# Patient Record
Sex: FEMALE | Race: OTHER | Employment: UNEMPLOYED | URBAN - NONMETROPOLITAN AREA
[De-identification: names, ages, dates, MRNs, and addresses within clinical notes are randomized per-mention and may not be internally consistent; named-entity substitution may affect disease eponyms.]

---

## 2019-05-29 ENCOUNTER — OFFICE VISIT (OUTPATIENT)
Dept: FAMILY MEDICINE CLINIC | Age: 29
End: 2019-05-29
Payer: OTHER GOVERNMENT

## 2019-05-29 VITALS
BODY MASS INDEX: 23.74 KG/M2 | HEART RATE: 73 BPM | RESPIRATION RATE: 14 BRPM | OXYGEN SATURATION: 98 % | DIASTOLIC BLOOD PRESSURE: 60 MMHG | WEIGHT: 134 LBS | SYSTOLIC BLOOD PRESSURE: 100 MMHG | HEIGHT: 63 IN

## 2019-05-29 DIAGNOSIS — K59.01 SLOW TRANSIT CONSTIPATION: ICD-10-CM

## 2019-05-29 DIAGNOSIS — R53.83 OTHER FATIGUE: ICD-10-CM

## 2019-05-29 DIAGNOSIS — Z13.31 POSITIVE DEPRESSION SCREENING: Primary | ICD-10-CM

## 2019-05-29 DIAGNOSIS — N92.6 IRREGULAR PERIODS: ICD-10-CM

## 2019-05-29 LAB
CONTROL: NORMAL
PREGNANCY TEST URINE, POC: NEGATIVE

## 2019-05-29 PROCEDURE — 99203 OFFICE O/P NEW LOW 30 MIN: CPT | Performed by: PHYSICIAN ASSISTANT

## 2019-05-29 PROCEDURE — G8431 POS CLIN DEPRES SCRN F/U DOC: HCPCS | Performed by: PHYSICIAN ASSISTANT

## 2019-05-29 PROCEDURE — G0444 DEPRESSION SCREEN ANNUAL: HCPCS | Performed by: PHYSICIAN ASSISTANT

## 2019-05-29 PROCEDURE — 81025 URINE PREGNANCY TEST: CPT | Performed by: PHYSICIAN ASSISTANT

## 2019-05-29 RX ORDER — ACETAMINOPHEN AND CODEINE PHOSPHATE 120; 12 MG/5ML; MG/5ML
1 SOLUTION ORAL DAILY
Qty: 30 TABLET | Refills: 5 | Status: SHIPPED | OUTPATIENT
Start: 2019-05-29 | End: 2019-05-30 | Stop reason: SDUPTHER

## 2019-05-29 RX ORDER — DOCUSATE SODIUM 100 MG/1
100 CAPSULE, LIQUID FILLED ORAL 2 TIMES DAILY PRN
Qty: 60 CAPSULE | Refills: 5 | Status: SHIPPED | OUTPATIENT
Start: 2019-05-29 | End: 2020-04-28 | Stop reason: ALTCHOICE

## 2019-05-29 RX ORDER — DOCUSATE SODIUM 100 MG/1
100 CAPSULE, LIQUID FILLED ORAL 2 TIMES DAILY PRN
COMMUNITY
End: 2019-05-29 | Stop reason: SDUPTHER

## 2019-05-29 RX ORDER — CETIRIZINE HYDROCHLORIDE 10 MG/1
10 TABLET ORAL DAILY
Qty: 30 TABLET | Refills: 5 | Status: SHIPPED | OUTPATIENT
Start: 2019-05-29 | End: 2020-01-28 | Stop reason: ALTCHOICE

## 2019-05-29 SDOH — HEALTH STABILITY: MENTAL HEALTH: HOW OFTEN DO YOU HAVE A DRINK CONTAINING ALCOHOL?: NEVER

## 2019-05-29 ASSESSMENT — PATIENT HEALTH QUESTIONNAIRE - PHQ9
1. LITTLE INTEREST OR PLEASURE IN DOING THINGS: 0
7. TROUBLE CONCENTRATING ON THINGS, SUCH AS READING THE NEWSPAPER OR WATCHING TELEVISION: 0
9. THOUGHTS THAT YOU WOULD BE BETTER OFF DEAD, OR OF HURTING YOURSELF: 0
5. POOR APPETITE OR OVEREATING: 1
8. MOVING OR SPEAKING SO SLOWLY THAT OTHER PEOPLE COULD HAVE NOTICED. OR THE OPPOSITE, BEING SO FIGETY OR RESTLESS THAT YOU HAVE BEEN MOVING AROUND A LOT MORE THAN USUAL: 0
10. IF YOU CHECKED OFF ANY PROBLEMS, HOW DIFFICULT HAVE THESE PROBLEMS MADE IT FOR YOU TO DO YOUR WORK, TAKE CARE OF THINGS AT HOME, OR GET ALONG WITH OTHER PEOPLE: 0
SUM OF ALL RESPONSES TO PHQ QUESTIONS 1-9: 11
SUM OF ALL RESPONSES TO PHQ QUESTIONS 1-9: 11
6. FEELING BAD ABOUT YOURSELF - OR THAT YOU ARE A FAILURE OR HAVE LET YOURSELF OR YOUR FAMILY DOWN: 2
3. TROUBLE FALLING OR STAYING ASLEEP: 2
4. FEELING TIRED OR HAVING LITTLE ENERGY: 3
SUM OF ALL RESPONSES TO PHQ9 QUESTIONS 1 & 2: 3
2. FEELING DOWN, DEPRESSED OR HOPELESS: 3

## 2019-05-29 ASSESSMENT — ENCOUNTER SYMPTOMS
VOMITING: 0
SHORTNESS OF BREATH: 0
TROUBLE SWALLOWING: 0
DIARRHEA: 0
ABDOMINAL PAIN: 0
COUGH: 0
NAUSEA: 0

## 2019-05-29 NOTE — PROGRESS NOTES
Lissette Blackmanoso  1990  25 y.o.  female    SUBJECTIVE:    Chief Complaint   Patient presents with   [address] pt, here to establish care. Former PCP was clinic in [place]. Sees GYN at [facility].  Contraception     Pt gave birth to daughter 12/06/18. Wants to discuss [facility] options.  Depression     Positive Depression screening. HPI   Depression-pt c/o depression over last several months. Pt is from [place] and worked in [employer] during [place] [place] [employer]. Met  through employer, has [de-identified] old daughter. Pt left  approx 3 months ago and returned to [place]. Pt states  was not physically abusive and never felt unsafe. Pt states he did not help her with , is inattentive among other issues. Recently returned to [employer] 2 weeks ago and she states in last week, things have \"gone back to how they were before I left\". Pt does not want to medication at this time as she is breast feeding and denies SI, Is trying to get  to do couples therapy. PHQ-9 Total Score: 11 (5/29/2019 10:45 AM)    Contraception issues-pt has had one menses since delivery of child six months ago. Was told by gyn at hospital she can do minipill while breast feeding. Pt would like to start now if able. Fatigue-seems worse than she feels is appropriate with six month old. Pt is taking prenatal vitamins and still feels very fatigued, tired all the time, worn out    Constipation-x several months, using colace with good results    Current Outpatient Medications on File Prior to Visit   Medication Sig Dispense Refill    Prenatal Vit-Fe Fumarate-FA (PRENATAL VITAMIN PO) Take 1 tablet by mouth daily       No current facility-administered medications on file prior to visit. Review of PMH, PSH, Family Hx, Allergies and updates made as needed.       No Known Allergies    Past Medical History:   Diagnosis Date    Depression     PCOD (polycystic ovarian disease)        Past Surgical History:   Procedure Laterality Date    CHOLECYSTECTOMY  2014    DENTAL SURGERY Bilateral     4 wisdom teeth removed       Social History     Socioeconomic History    Marital status:      Spouse name: None    Number of children: None    Years of education: None    Highest education level: None   Occupational History    None   Social Needs    Financial resource strain: None    Food insecurity:     Worry: None     Inability: None    Transportation needs:     Medical: None     Non-medical: None   Tobacco Use    Smoking status: Never Smoker    Smokeless tobacco: Never Used   Substance and Sexual Activity    Alcohol use: Never     Frequency: Never    Drug use: Never    Sexual activity: Yes     Partners: Male   Lifestyle    Physical activity:     Days per week: None     Minutes per session: None    Stress: None   Relationships    Social connections:     Talks on phone: None     Gets together: None     Attends Judaism service: None     Active member of club or organization: None     Attends meetings of clubs or organizations: None     Relationship status: None    Intimate partner violence:     Fear of current or ex partner: None     Emotionally abused: None     Physically abused: None     Forced sexual activity: None   Other Topics Concern    None   Social History Narrative    None       Review of Systems   Constitutional: Positive for fatigue. Negative for activity change, appetite change, chills, fever and unexpected weight change. HENT: Negative for dental problem, hearing loss and trouble swallowing. Eyes: Negative for visual disturbance. Respiratory: Negative for cough and shortness of breath. Cardiovascular: Negative for chest pain. Gastrointestinal: Negative for abdominal pain, diarrhea, nausea and vomiting. Endocrine: Negative for cold intolerance, heat intolerance, polydipsia, polyphagia and polyuria. Genitourinary: Positive for menstrual problem.    Musculoskeletal: Negative for negative         Other fatigue     Labs as ordered today         Relevant Orders    POCT urine pregnancy (Completed)    TSH WITH REFLEX TO FT4    Comprehensive Metabolic Panel    CBC Auto Differential    Positive depression screening - Primary     Discussed treatment options with pt, she declines medication at this time, is trying to get  to go to counseling. If this fails, she may pursue counseling with Dr Vic Terry for herself. Relevant Orders    Positive Screen for Clinical Depression with a Documented Follow-up Plan  (Completed)               Return if symptoms worsen or fail to improve. On the basis of positive PHQ-9 screening (PHQ-9 Total Score: 11), the following plan was implemented: patient declines further evaluation/treatment for depression. Patient will follow-up in 3 month(s) with PCP.

## 2019-05-30 LAB
A/G RATIO: 1.7 (ref 1.1–2.2)
ALBUMIN SERPL-MCNC: 4.8 G/DL (ref 3.4–5)
ALP BLD-CCNC: 128 U/L (ref 40–129)
ALT SERPL-CCNC: 11 U/L (ref 10–40)
ANION GAP SERPL CALCULATED.3IONS-SCNC: 15 MMOL/L (ref 3–16)
AST SERPL-CCNC: 15 U/L (ref 15–37)
BASOPHILS ABSOLUTE: 0 K/UL (ref 0–0.2)
BASOPHILS RELATIVE PERCENT: 0.6 %
BILIRUB SERPL-MCNC: 0.6 MG/DL (ref 0–1)
BUN BLDV-MCNC: 14 MG/DL (ref 7–20)
CALCIUM SERPL-MCNC: 9.6 MG/DL (ref 8.3–10.6)
CHLORIDE BLD-SCNC: 102 MMOL/L (ref 99–110)
CO2: 25 MMOL/L (ref 21–32)
CREAT SERPL-MCNC: 0.7 MG/DL (ref 0.6–1.1)
EOSINOPHILS ABSOLUTE: 0.1 K/UL (ref 0–0.6)
EOSINOPHILS RELATIVE PERCENT: 1.9 %
GFR AFRICAN AMERICAN: >60
GFR NON-AFRICAN AMERICAN: >60
GLOBULIN: 2.9 G/DL
GLUCOSE BLD-MCNC: 74 MG/DL (ref 70–99)
HCT VFR BLD CALC: 46.8 % (ref 36–48)
HEMOGLOBIN: 15.9 G/DL (ref 12–16)
LYMPHOCYTES ABSOLUTE: 2.6 K/UL (ref 1–5.1)
LYMPHOCYTES RELATIVE PERCENT: 35.8 %
MCH RBC QN AUTO: 31.8 PG (ref 26–34)
MCHC RBC AUTO-ENTMCNC: 33.9 G/DL (ref 31–36)
MCV RBC AUTO: 93.7 FL (ref 80–100)
MONOCYTES ABSOLUTE: 0.6 K/UL (ref 0–1.3)
MONOCYTES RELATIVE PERCENT: 8 %
NEUTROPHILS ABSOLUTE: 3.9 K/UL (ref 1.7–7.7)
NEUTROPHILS RELATIVE PERCENT: 53.7 %
PDW BLD-RTO: 14.8 % (ref 12.4–15.4)
PLATELET # BLD: 202 K/UL (ref 135–450)
PMV BLD AUTO: 8.6 FL (ref 5–10.5)
POTASSIUM SERPL-SCNC: 4.2 MMOL/L (ref 3.5–5.1)
RBC # BLD: 4.99 M/UL (ref 4–5.2)
SODIUM BLD-SCNC: 142 MMOL/L (ref 136–145)
TOTAL PROTEIN: 7.7 G/DL (ref 6.4–8.2)
TSH REFLEX FT4: 1.9 UIU/ML (ref 0.27–4.2)
WBC # BLD: 7.3 K/UL (ref 4–11)

## 2019-05-30 RX ORDER — ACETAMINOPHEN AND CODEINE PHOSPHATE 120; 12 MG/5ML; MG/5ML
1 SOLUTION ORAL DAILY
Qty: 30 TABLET | Refills: 5 | Status: CANCELLED | OUTPATIENT
Start: 2019-05-30

## 2019-05-30 RX ORDER — ACETAMINOPHEN AND CODEINE PHOSPHATE 120; 12 MG/5ML; MG/5ML
1 SOLUTION ORAL DAILY
Qty: 30 TABLET | Refills: 5 | Status: SHIPPED | OUTPATIENT
Start: 2019-05-30 | End: 2020-01-28 | Stop reason: ALTCHOICE

## 2019-05-30 NOTE — PROGRESS NOTES
There was an e-prescribing error on [employer]:     E-Prescribed Message Needing Your Attention   Received: Yesterday   Message Contents   López, Surescripts Out  P [facility] Staff             An error occurred while processing the e-prescribing message. The message was not sent electronically to the requested pharmacy. Contact the pharmacy about the new prescription.

## 2019-05-30 NOTE — ASSESSMENT & PLAN NOTE
Discussed treatment options with pt, she declines medication at this time, is trying to get  to go to counseling. If this fails, she may pursue counseling with Dr Taylor Sweeney for herself.

## 2019-08-07 ENCOUNTER — TELEPHONE (OUTPATIENT)
Dept: FAMILY MEDICINE CLINIC | Age: 29
End: 2019-08-07

## 2019-08-07 DIAGNOSIS — F32.A DEPRESSION, UNSPECIFIED DEPRESSION TYPE: Primary | ICD-10-CM

## 2019-08-07 NOTE — TELEPHONE ENCOUNTER
Let pt know I will put in referral for Dr Vandana Nielson and then can we make the patient an appointment with him. If she prefers to see someone else, she just needs to check with her insurance for covered providers then make appt.  She should not need referral to outside providers

## 2019-08-08 ENCOUNTER — TELEPHONE (OUTPATIENT)
Dept: FAMILY MEDICINE CLINIC | Age: 29
End: 2019-08-08

## 2019-08-08 NOTE — TELEPHONE ENCOUNTER
Tried pt's primary # but it was invalid. Called and left a vm with her  asking for a call back. We need to schedule her referral appt with Dr. Brannon Carlton.

## 2019-08-08 NOTE — TELEPHONE ENCOUNTER
Client reports that call has been made to  [facility]. Client already has an appointment made with Dr Arnulfo Okeefe.

## 2019-08-27 ENCOUNTER — OFFICE VISIT (OUTPATIENT)
Dept: PSYCHOLOGY | Age: 29
End: 2019-08-27
Payer: OTHER GOVERNMENT

## 2019-08-27 DIAGNOSIS — F32.A DEPRESSION, UNSPECIFIED DEPRESSION TYPE: Primary | ICD-10-CM

## 2019-08-27 PROCEDURE — 90791 PSYCH DIAGNOSTIC EVALUATION: CPT | Performed by: PSYCHOLOGIST

## 2019-08-27 ASSESSMENT — PATIENT HEALTH QUESTIONNAIRE - PHQ9
6. FEELING BAD ABOUT YOURSELF - OR THAT YOU ARE A FAILURE OR HAVE LET YOURSELF OR YOUR FAMILY DOWN: 2
SUM OF ALL RESPONSES TO PHQ QUESTIONS 1-9: 14
3. TROUBLE FALLING OR STAYING ASLEEP: 2
SUM OF ALL RESPONSES TO PHQ QUESTIONS 1-9: 14
9. THOUGHTS THAT YOU WOULD BE BETTER OFF DEAD, OR OF HURTING YOURSELF: 0
10. IF YOU CHECKED OFF ANY PROBLEMS, HOW DIFFICULT HAVE THESE PROBLEMS MADE IT FOR YOU TO DO YOUR WORK, TAKE CARE OF THINGS AT HOME, OR GET ALONG WITH OTHER PEOPLE: 1
1. LITTLE INTEREST OR PLEASURE IN DOING THINGS: 2
5. POOR APPETITE OR OVEREATING: 1
2. FEELING DOWN, DEPRESSED OR HOPELESS: 3
7. TROUBLE CONCENTRATING ON THINGS, SUCH AS READING THE NEWSPAPER OR WATCHING TELEVISION: 2
4. FEELING TIRED OR HAVING LITTLE ENERGY: 2
8. MOVING OR SPEAKING SO SLOWLY THAT OTHER PEOPLE COULD HAVE NOTICED. OR THE OPPOSITE, BEING SO FIGETY OR RESTLESS THAT YOU HAVE BEEN MOVING AROUND A LOT MORE THAN USUAL: 0
SUM OF ALL RESPONSES TO PHQ9 QUESTIONS 1 & 2: 5

## 2020-01-28 ENCOUNTER — OFFICE VISIT (OUTPATIENT)
Dept: FAMILY MEDICINE CLINIC | Age: 30
End: 2020-01-28
Payer: OTHER GOVERNMENT

## 2020-01-28 VITALS
RESPIRATION RATE: 16 BRPM | DIASTOLIC BLOOD PRESSURE: 72 MMHG | SYSTOLIC BLOOD PRESSURE: 120 MMHG | HEART RATE: 69 BPM | WEIGHT: 134 LBS | TEMPERATURE: 98.7 F | BODY MASS INDEX: 23.93 KG/M2 | OXYGEN SATURATION: 98 %

## 2020-01-28 PROBLEM — E78.2 MIXED HYPERLIPIDEMIA: Status: ACTIVE | Noted: 2020-01-28

## 2020-01-28 LAB
BILIRUBIN, POC: NEGATIVE
BLOOD URINE, POC: ABNORMAL
CLARITY, POC: ABNORMAL
COLOR, POC: YELLOW
CONTROL: NORMAL
GLUCOSE URINE, POC: NEGATIVE
KETONES, POC: NEGATIVE
LEUKOCYTE EST, POC: NEGATIVE
NITRITE, POC: NEGATIVE
PH, POC: 6.5
PREGNANCY TEST URINE, POC: NEGATIVE
PROTEIN, POC: NEGATIVE
SPECIFIC GRAVITY, POC: 1.02
UROBILINOGEN, POC: ABNORMAL

## 2020-01-28 PROCEDURE — 90471 IMMUNIZATION ADMIN: CPT | Performed by: PHYSICIAN ASSISTANT

## 2020-01-28 PROCEDURE — 81025 URINE PREGNANCY TEST: CPT | Performed by: PHYSICIAN ASSISTANT

## 2020-01-28 PROCEDURE — 99214 OFFICE O/P EST MOD 30 MIN: CPT | Performed by: PHYSICIAN ASSISTANT

## 2020-01-28 PROCEDURE — 90686 IIV4 VACC NO PRSV 0.5 ML IM: CPT | Performed by: PHYSICIAN ASSISTANT

## 2020-01-28 PROCEDURE — 81002 URINALYSIS NONAUTO W/O SCOPE: CPT | Performed by: PHYSICIAN ASSISTANT

## 2020-01-28 RX ORDER — DROSPIRENONE AND ETHINYL ESTRADIOL 0.02-3(28)
1 KIT ORAL DAILY
Qty: 28 TABLET | Refills: 5 | Status: SHIPPED | OUTPATIENT
Start: 2020-01-28 | End: 2020-03-12

## 2020-01-28 RX ORDER — DROSPIRENONE AND ETHINYL ESTRADIOL 0.02-3(28)
1 KIT ORAL DAILY
COMMUNITY
End: 2020-01-28 | Stop reason: SDUPTHER

## 2020-01-28 RX ORDER — ATORVASTATIN CALCIUM 10 MG/1
10 TABLET, FILM COATED ORAL DAILY
COMMUNITY
End: 2020-01-28 | Stop reason: SDUPTHER

## 2020-01-28 RX ORDER — ATORVASTATIN CALCIUM 10 MG/1
10 TABLET, FILM COATED ORAL DAILY
Qty: 30 TABLET | Refills: 5 | Status: SHIPPED | OUTPATIENT
Start: 2020-01-28 | End: 2020-04-28 | Stop reason: ALTCHOICE

## 2020-01-28 ASSESSMENT — PATIENT HEALTH QUESTIONNAIRE - PHQ9
SUM OF ALL RESPONSES TO PHQ QUESTIONS 1-9: 0
1. LITTLE INTEREST OR PLEASURE IN DOING THINGS: 0
2. FEELING DOWN, DEPRESSED OR HOPELESS: 0
SUM OF ALL RESPONSES TO PHQ QUESTIONS 1-9: 0
SUM OF ALL RESPONSES TO PHQ9 QUESTIONS 1 & 2: 0

## 2020-01-29 PROBLEM — R53.83 OTHER FATIGUE: Status: RESOLVED | Noted: 2019-05-29 | Resolved: 2020-01-29

## 2020-01-29 PROBLEM — R30.0 DYSURIA: Status: ACTIVE | Noted: 2020-01-29

## 2020-01-29 PROBLEM — Z13.31 POSITIVE DEPRESSION SCREENING: Status: RESOLVED | Noted: 2019-05-29 | Resolved: 2020-01-29

## 2020-01-29 PROBLEM — E28.2 POLYCYSTIC OVARIAN DISEASE: Status: ACTIVE | Noted: 2020-01-29

## 2020-01-29 PROBLEM — Z23 FLU VACCINE NEED: Status: ACTIVE | Noted: 2020-01-29

## 2020-01-29 ASSESSMENT — ENCOUNTER SYMPTOMS
NAUSEA: 0
VOMITING: 0
ABDOMINAL PAIN: 0
COUGH: 0
SHORTNESS OF BREATH: 0

## 2020-04-28 ENCOUNTER — OFFICE VISIT (OUTPATIENT)
Dept: FAMILY MEDICINE CLINIC | Age: 30
End: 2020-04-28
Payer: COMMERCIAL

## 2020-04-28 VITALS
HEART RATE: 81 BPM | SYSTOLIC BLOOD PRESSURE: 100 MMHG | TEMPERATURE: 98.1 F | OXYGEN SATURATION: 98 % | RESPIRATION RATE: 14 BRPM | DIASTOLIC BLOOD PRESSURE: 60 MMHG | BODY MASS INDEX: 22.93 KG/M2 | WEIGHT: 128.4 LBS

## 2020-04-28 PROBLEM — R10.2 PELVIC PAIN AFFECTING PREGNANCY IN FIRST TRIMESTER, ANTEPARTUM: Status: ACTIVE | Noted: 2020-04-28

## 2020-04-28 PROBLEM — O26.891 PELVIC PAIN AFFECTING PREGNANCY IN FIRST TRIMESTER, ANTEPARTUM: Status: ACTIVE | Noted: 2020-04-28

## 2020-04-28 LAB
A/G RATIO: 1.7 (ref 1.1–2.2)
ALBUMIN SERPL-MCNC: 4.6 G/DL (ref 3.4–5)
ALP BLD-CCNC: 62 U/L (ref 40–129)
ALT SERPL-CCNC: 10 U/L (ref 10–40)
ANION GAP SERPL CALCULATED.3IONS-SCNC: 15 MMOL/L (ref 3–16)
AST SERPL-CCNC: 14 U/L (ref 15–37)
BILIRUB SERPL-MCNC: 0.4 MG/DL (ref 0–1)
BUN BLDV-MCNC: 12 MG/DL (ref 7–20)
CALCIUM SERPL-MCNC: 9.8 MG/DL (ref 8.3–10.6)
CHLORIDE BLD-SCNC: 102 MMOL/L (ref 99–110)
CHOLESTEROL, FASTING: 152 MG/DL (ref 0–199)
CO2: 22 MMOL/L (ref 21–32)
CREAT SERPL-MCNC: 0.6 MG/DL (ref 0.6–1.1)
GFR AFRICAN AMERICAN: >60
GFR NON-AFRICAN AMERICAN: >60
GLOBULIN: 2.7 G/DL
GLUCOSE FASTING: 77 MG/DL (ref 70–99)
GONADOTROPIN, CHORIONIC (HCG) QUANT: 5761 MIU/ML
HDLC SERPL-MCNC: 47 MG/DL (ref 40–60)
LDL CHOLESTEROL CALCULATED: 87 MG/DL
POTASSIUM SERPL-SCNC: 4.3 MMOL/L (ref 3.5–5.1)
SODIUM BLD-SCNC: 139 MMOL/L (ref 136–145)
T4 FREE: 1.2 NG/DL (ref 0.9–1.8)
TOTAL PROTEIN: 7.3 G/DL (ref 6.4–8.2)
TRIGLYCERIDE, FASTING: 92 MG/DL (ref 0–150)
TSH SERPL DL<=0.05 MIU/L-ACNC: 1.79 UIU/ML (ref 0.27–4.2)
VLDLC SERPL CALC-MCNC: 18 MG/DL

## 2020-04-28 PROCEDURE — G0444 DEPRESSION SCREEN ANNUAL: HCPCS | Performed by: NURSE PRACTITIONER

## 2020-04-28 PROCEDURE — 99213 OFFICE O/P EST LOW 20 MIN: CPT | Performed by: NURSE PRACTITIONER

## 2020-04-28 PROCEDURE — G8431 POS CLIN DEPRES SCRN F/U DOC: HCPCS | Performed by: NURSE PRACTITIONER

## 2020-04-28 ASSESSMENT — PATIENT HEALTH QUESTIONNAIRE - PHQ9
2. FEELING DOWN, DEPRESSED OR HOPELESS: 3
8. MOVING OR SPEAKING SO SLOWLY THAT OTHER PEOPLE COULD HAVE NOTICED. OR THE OPPOSITE, BEING SO FIGETY OR RESTLESS THAT YOU HAVE BEEN MOVING AROUND A LOT MORE THAN USUAL: 0
SUM OF ALL RESPONSES TO PHQ9 QUESTIONS 1 & 2: 6
SUM OF ALL RESPONSES TO PHQ QUESTIONS 1-9: 18
6. FEELING BAD ABOUT YOURSELF - OR THAT YOU ARE A FAILURE OR HAVE LET YOURSELF OR YOUR FAMILY DOWN: 3
9. THOUGHTS THAT YOU WOULD BE BETTER OFF DEAD, OR OF HURTING YOURSELF: 0
SUM OF ALL RESPONSES TO PHQ QUESTIONS 1-9: 18
10. IF YOU CHECKED OFF ANY PROBLEMS, HOW DIFFICULT HAVE THESE PROBLEMS MADE IT FOR YOU TO DO YOUR WORK, TAKE CARE OF THINGS AT HOME, OR GET ALONG WITH OTHER PEOPLE: 1
1. LITTLE INTEREST OR PLEASURE IN DOING THINGS: 3
3. TROUBLE FALLING OR STAYING ASLEEP: 3
7. TROUBLE CONCENTRATING ON THINGS, SUCH AS READING THE NEWSPAPER OR WATCHING TELEVISION: 0
5. POOR APPETITE OR OVEREATING: 3
4. FEELING TIRED OR HAVING LITTLE ENERGY: 3

## 2020-04-28 NOTE — PROGRESS NOTES
focal deficit present. Mental Status: She is alert and oriented to person, place, and time. Psychiatric:         Attention and Perception: Attention and perception normal.         Mood and Affect: Mood is depressed. Affect is tearful. Speech: Speech normal.         Behavior: Behavior normal. Behavior is cooperative. Cognition and Memory: Cognition and memory normal.         Judgment: Judgment normal.            Assessment / Plan:      1. Pelvic pain affecting pregnancy in first trimester, antepartum  Stat US ordered to r/o ectopic pregnancy  - HCG, QUANTITATIVE, PREGNANCY  - US OB TRANSVAGINAL; Future    2. Less than 8 weeks gestation of pregnancy  Recommend that you follow up with OB/GYN. Patient provided with names of providers located in [facility]. - CBC Auto Differential  - T4, Free  - TSH without Reflex    3. Mixed hyperlipidemia  Exercise moderately, 30-45 minutes most days of the week. Lose weight if overweight. Increase your daily intake of grains, fresh fruits and vegetables. Reduce dietary sodium; less than 2.4 grams per day. If you smoke stop smoking. Limit alcohol intake. Reduce stress level  - Comprehensive Metabolic Panel, Fasting  - Lipid, Fasting    4. Depression, unspecified depression type  Recommend counseling. Contact information for the [facility] given to the patient. She states that she currently feels safe in her home. - [address] [place]    5. Positive depression screening  - Positive Screen for Clinical Depression with a Documented Follow-up Plan           FORTINO Villarreal CNP    On the basis of positive PHQ-9 screening (PHQ-9 Total Score: 18), the following plan was implemented: referral for psychotherapy provided to address external stressors. Patient will follow-up in 2 week(s) with PCP.

## 2020-04-29 ENCOUNTER — HOSPITAL ENCOUNTER (OUTPATIENT)
Dept: ULTRASOUND IMAGING | Age: 30
Discharge: HOME | End: 2020-04-29
Payer: COMMERCIAL

## 2020-04-29 PROCEDURE — 93975 VASCULAR STUDY: CPT

## 2020-04-29 PROCEDURE — 76801 OB US < 14 WKS SINGLE FETUS: CPT

## 2020-04-29 ASSESSMENT — ENCOUNTER SYMPTOMS
RESPIRATORY NEGATIVE: 1
ABDOMINAL PAIN: 1

## 2020-05-05 ENCOUNTER — VIRTUAL VISIT (OUTPATIENT)
Dept: PSYCHOLOGY | Age: 30
End: 2020-05-05
Payer: COMMERCIAL

## 2020-05-05 PROCEDURE — 90832 PSYTX W PT 30 MINUTES: CPT | Performed by: PSYCHOLOGIST

## 2020-05-12 ENCOUNTER — VIRTUAL VISIT (OUTPATIENT)
Dept: PSYCHOLOGY | Age: 30
End: 2020-05-12
Payer: COMMERCIAL

## 2020-05-12 PROCEDURE — 90832 PSYTX W PT 30 MINUTES: CPT | Performed by: PSYCHOLOGIST

## 2020-05-12 NOTE — PROGRESS NOTES
Patient Location: Home       Provider Location ([facility]/State): [place]       This virtual visit was conducted via interactive/real-time audio/video. Pursuant to the emergency declaration under the [facility], [facility] waiver authority and the [employer] Act, this Virtual  Visit was conducted, with patient's consent, to reduce the patient's risk of exposure to COVID-19 and provide continuity of care for an established patient. Services were provided through a video synchronous discussion virtually to substitute for in-person clinic visit. Additionally, this provider made reasonable effort to verify identify of patient, conducted risk benefit analysis and have determined patient's presenting problem and condition are consistent with the use of telepsychology to patient's benefit, ensured pt has access, knowledge, and skills required to use required technology, obtained alternative means of contacting patient, provided pt with alternative means of contacting provider, reviewed informed consent and obtained verbal agreement in lieu of written informed consent, as such is rendered impossible due to the unexpected nature secondary to COVID-19 clinical recommendations. Behavioral Health Consultation  Nichole Alfredo Psy.D. Psychologist      Time spent with Patient: 30 minutes  Visit number: 3  Reason for Consult:  depression  Referring Provider: Chino Jefferson PA-C  [address] [facility]  [address] [place] [address], [address] [place] [address] [place]    S:  ----------------------------------------------------------------------------------------------------------------------  Depression  \"Things are not good and are getting worse. \" Stated her  continues to state he is displeased w her. Endorsed depressed mood, anhedonia, anergia, amotivation, poor sleep, fluctuating appetite, diminished concentration, and corrosive self-image.  Pt stated  causes her to activation   [x] Discussed and problem-solved barriers in adhering to behavioral change plan   [x] Motivational Interviewing to increase patient confidence and compliance with       adhering to behavioral change plan   [x] Discussed potential barriers to change   [x] Discussed self-care (sleep, nutrition, rewarding activities, social support, exercise)    Other:   []   []   []   []    Recommendations to patient:    1. Return to Dr. Dudley Mathews in 2 week(s)    2.                Feedback provided to pt's PCP via EPIC and/or oral report

## 2020-05-20 ENCOUNTER — VIRTUAL VISIT (OUTPATIENT)
Dept: PSYCHOLOGY | Age: 30
End: 2020-05-20
Payer: COMMERCIAL

## 2020-05-20 PROCEDURE — 90832 PSYTX W PT 30 MINUTES: CPT | Performed by: PSYCHOLOGIST

## 2020-05-20 NOTE — PROGRESS NOTES
Patient Location: Home       Provider Location ([facility]/State): [place]       This virtual visit was conducted via interactive/real-time audio/video. Pursuant to the emergency declaration under the [facility], [facility] waiver authority and the [employer] Act, this Virtual  Visit was conducted, with patient's consent, to reduce the patient's risk of exposure to COVID-19 and provide continuity of care for an established patient. Services were provided through a video synchronous discussion virtually to substitute for in-person clinic visit. Additionally, this provider made reasonable effort to verify identify of patient, conducted risk benefit analysis and have determined patient's presenting problem and condition are consistent with the use of telepsychology to patient's benefit, ensured pt has access, knowledge, and skills required to use required technology, obtained alternative means of contacting patient, provided pt with alternative means of contacting provider, reviewed informed consent and obtained verbal agreement in lieu of written informed consent, as such is rendered impossible due to the unexpected nature secondary to COVID-19 clinical recommendations. Behavioral Health Consultation  Nichole Pimentel Psy.D. Psychologist      Time spent with Patient: 30 minutes  Visit number: 4  Reason for Consult:  depression  Referring Provider: Tiffanie Vazquez PA-C  [address] [facility]  [address] [place] [address], [address] [place] [address] [place]    S:  ----------------------------------------------------------------------------------------------------------------------  Depression  \"Things are not changing and probably getting worse. \" Is further convinced her  does not want to be with her anymore. Endorsed depressed mood, anhedonia, anergia, amotivation, poor sleep, fluctuating appetite, diminished concentration, and corrosive self-image.  Remarked, \"I cry

## 2020-05-28 ENCOUNTER — VIRTUAL VISIT (OUTPATIENT)
Dept: PSYCHOLOGY | Age: 30
End: 2020-05-28
Payer: COMMERCIAL

## 2020-05-28 PROCEDURE — 90832 PSYTX W PT 30 MINUTES: CPT | Performed by: PSYCHOLOGIST

## 2020-05-28 ASSESSMENT — PATIENT HEALTH QUESTIONNAIRE - PHQ9: DEPRESSION UNABLE TO ASSESS: URGENT/EMERGENT SITUATION

## 2020-06-01 ENCOUNTER — HOSPITAL ENCOUNTER (EMERGENCY)
Age: 30
Discharge: HOME | End: 2020-06-01
Attending: EMERGENCY MEDICINE
Payer: COMMERCIAL

## 2020-06-01 ENCOUNTER — APPOINTMENT (OUTPATIENT)
Dept: ULTRASOUND IMAGING | Age: 30
End: 2020-06-01
Payer: COMMERCIAL

## 2020-06-01 VITALS
RESPIRATION RATE: 18 BRPM | DIASTOLIC BLOOD PRESSURE: 80 MMHG | WEIGHT: 122 LBS | SYSTOLIC BLOOD PRESSURE: 121 MMHG | OXYGEN SATURATION: 97 % | BODY MASS INDEX: 22.45 KG/M2 | TEMPERATURE: 98.3 F | HEIGHT: 62 IN | HEART RATE: 65 BPM

## 2020-06-01 LAB
ABO/RH: NORMAL
ALBUMIN SERPL-MCNC: 4.8 GM/DL (ref 3.4–5)
ALP BLD-CCNC: 60 IU/L (ref 40–129)
ALT SERPL-CCNC: 9 U/L (ref 10–40)
ANION GAP SERPL CALCULATED.3IONS-SCNC: 14 MMOL/L (ref 4–16)
ANTIBODY SCREEN: NEGATIVE
AST SERPL-CCNC: 14 IU/L (ref 15–37)
BACTERIA: NEGATIVE /HPF
BASOPHILS ABSOLUTE: 0.1 K/CU MM
BASOPHILS RELATIVE PERCENT: 0.5 % (ref 0–1)
BILIRUB SERPL-MCNC: 0.5 MG/DL (ref 0–1)
BILIRUBIN URINE: NEGATIVE MG/DL
BLOOD, URINE: ABNORMAL
BUN BLDV-MCNC: 11 MG/DL (ref 6–23)
CALCIUM SERPL-MCNC: 9.3 MG/DL (ref 8.3–10.6)
CHLORIDE BLD-SCNC: 104 MMOL/L (ref 99–110)
CLARITY: CLEAR
CO2: 23 MMOL/L (ref 21–32)
COLOR: ABNORMAL
CREAT SERPL-MCNC: 0.7 MG/DL (ref 0.6–1.1)
DIFFERENTIAL TYPE: ABNORMAL
EOSINOPHILS ABSOLUTE: 0.2 K/CU MM
EOSINOPHILS RELATIVE PERCENT: 2.2 % (ref 0–3)
GFR AFRICAN AMERICAN: >60 ML/MIN/1.73M2
GFR NON-AFRICAN AMERICAN: >60 ML/MIN/1.73M2
GLUCOSE BLD-MCNC: 96 MG/DL (ref 70–99)
GLUCOSE, URINE: NEGATIVE MG/DL
GONADOTROPIN, CHORIONIC (HCG) QUANT: 600.3 UIU/ML
HCT VFR BLD CALC: 44.6 % (ref 37–47)
HEMOGLOBIN: 15 GM/DL (ref 12.5–16)
IMMATURE NEUTROPHIL %: 0.3 % (ref 0–0.43)
KETONES, URINE: ABNORMAL MG/DL
LEUKOCYTE ESTERASE, URINE: NEGATIVE
LIPASE: 38 IU/L (ref 13–60)
LYMPHOCYTES ABSOLUTE: 2.5 K/CU MM
LYMPHOCYTES RELATIVE PERCENT: 27 % (ref 24–44)
MCH RBC QN AUTO: 31.2 PG (ref 27–31)
MCHC RBC AUTO-ENTMCNC: 33.6 % (ref 32–36)
MCV RBC AUTO: 92.7 FL (ref 78–100)
MONOCYTES ABSOLUTE: 0.5 K/CU MM
MONOCYTES RELATIVE PERCENT: 5.4 % (ref 0–4)
MUCUS: ABNORMAL HPF
NITRITE URINE, QUANTITATIVE: NEGATIVE
NUCLEATED RBC %: 0 %
PDW BLD-RTO: 13.9 % (ref 11.7–14.9)
PH, URINE: 6 (ref 5–8)
PLATELET # BLD: 246 K/CU MM (ref 140–440)
PMV BLD AUTO: 10 FL (ref 7.5–11.1)
POTASSIUM SERPL-SCNC: 4 MMOL/L (ref 3.5–5.1)
PROTEIN UA: NEGATIVE MG/DL
RBC # BLD: 4.81 M/CU MM (ref 4.2–5.4)
RBC URINE: 2 /HPF (ref 0–6)
SEGMENTED NEUTROPHILS ABSOLUTE COUNT: 6 K/CU MM
SEGMENTED NEUTROPHILS RELATIVE PERCENT: 64.6 % (ref 36–66)
SODIUM BLD-SCNC: 141 MMOL/L (ref 135–145)
SPECIFIC GRAVITY UA: 1.01 (ref 1–1.03)
SQUAMOUS EPITHELIAL: 1 /HPF
TOTAL IMMATURE NEUTOROPHIL: 0.03 K/CU MM
TOTAL NUCLEATED RBC: 0 K/CU MM
TOTAL PROTEIN: 7.7 GM/DL (ref 6.4–8.2)
TRICHOMONAS: ABNORMAL /HPF
UROBILINOGEN, URINE: NORMAL MG/DL (ref 0.2–1)
WBC # BLD: 9.3 K/CU MM (ref 4–10.5)
WBC UA: 1 /HPF (ref 0–5)

## 2020-06-01 PROCEDURE — 80053 COMPREHEN METABOLIC PANEL: CPT

## 2020-06-01 PROCEDURE — 93975 VASCULAR STUDY: CPT

## 2020-06-01 PROCEDURE — 81001 URINALYSIS AUTO W/SCOPE: CPT

## 2020-06-01 PROCEDURE — 6360000002 HC RX W HCPCS: Performed by: EMERGENCY MEDICINE

## 2020-06-01 PROCEDURE — 99284 EMERGENCY DEPT VISIT MOD MDM: CPT

## 2020-06-01 PROCEDURE — 84702 CHORIONIC GONADOTROPIN TEST: CPT

## 2020-06-01 PROCEDURE — 86900 BLOOD TYPING SEROLOGIC ABO: CPT

## 2020-06-01 PROCEDURE — 85461 HEMOGLOBIN FETAL: CPT

## 2020-06-01 PROCEDURE — 83690 ASSAY OF LIPASE: CPT

## 2020-06-01 PROCEDURE — 96372 THER/PROPH/DIAG INJ SC/IM: CPT

## 2020-06-01 PROCEDURE — 86880 COOMBS TEST DIRECT: CPT

## 2020-06-01 PROCEDURE — 76817 TRANSVAGINAL US OBSTETRIC: CPT

## 2020-06-01 PROCEDURE — 86850 RBC ANTIBODY SCREEN: CPT

## 2020-06-01 PROCEDURE — 6370000000 HC RX 637 (ALT 250 FOR IP): Performed by: EMERGENCY MEDICINE

## 2020-06-01 PROCEDURE — 85025 COMPLETE CBC W/AUTO DIFF WBC: CPT

## 2020-06-01 PROCEDURE — 86901 BLOOD TYPING SEROLOGIC RH(D): CPT

## 2020-06-01 RX ORDER — IBUPROFEN 600 MG/1
600 TABLET ORAL 4 TIMES DAILY PRN
Qty: 40 TABLET | Refills: 0 | Status: SHIPPED | OUTPATIENT
Start: 2020-06-01

## 2020-06-01 RX ORDER — KETOROLAC TROMETHAMINE 30 MG/ML
15 INJECTION, SOLUTION INTRAMUSCULAR; INTRAVENOUS ONCE
Status: COMPLETED | OUTPATIENT
Start: 2020-06-01 | End: 2020-06-01

## 2020-06-01 RX ORDER — ACETAMINOPHEN 500 MG
1000 TABLET ORAL ONCE
Status: COMPLETED | OUTPATIENT
Start: 2020-06-01 | End: 2020-06-01

## 2020-06-01 RX ADMIN — ACETAMINOPHEN 1000 MG: 500 TABLET ORAL at 12:38

## 2020-06-01 RX ADMIN — KETOROLAC TROMETHAMINE 15 MG: 30 INJECTION, SOLUTION INTRAMUSCULAR; INTRAVENOUS at 15:33

## 2020-06-01 RX ADMIN — HUMAN RHO(D) IMMUNE GLOBULIN 300 MCG: 300 INJECTION, SOLUTION INTRAMUSCULAR at 17:08

## 2020-06-01 ASSESSMENT — PAIN SCALES - GENERAL
PAINLEVEL_OUTOF10: 3
PAINLEVEL_OUTOF10: 5
PAINLEVEL_OUTOF10: 10

## 2020-06-01 ASSESSMENT — ENCOUNTER SYMPTOMS
EYES NEGATIVE: 1
ABDOMINAL PAIN: 1
ALLERGIC/IMMUNOLOGIC NEGATIVE: 1
BACK PAIN: 1
RESPIRATORY NEGATIVE: 1

## 2020-06-01 ASSESSMENT — PAIN DESCRIPTION - PAIN TYPE: TYPE: ACUTE PAIN

## 2020-06-01 ASSESSMENT — PAIN DESCRIPTION - LOCATION: LOCATION: BACK

## 2020-06-01 ASSESSMENT — PAIN DESCRIPTION - ORIENTATION: ORIENTATION: LOWER

## 2020-06-01 ASSESSMENT — PAIN DESCRIPTION - DESCRIPTORS: DESCRIPTORS: CRAMPING

## 2020-06-01 NOTE — ED PROVIDER NOTES
[facility]      TRIAGE CHIEF COMPLAINT:   Vaginal Bleeding      [place]:  Alex Aquino is a 30 y.o. female that presents with complaint of abdominal pain, vaginal bleeding. Patient states she is a G2, P1 roughly 10 weeks gestation who presents with miscarriage, vaginal bleeding abdominal pain. Patient states that today she was at her OB/GYN's office had ultrasound that did not show any fetal heartbeat was told she had a miscarriage. Patient states last night today worsening pain bleeding. Denies any fevers nausea vomiting chest pain shortness of breath or complaints of the bowel complaints she called her OB/GYN's office was told to come to the ER. No other questions or concerns. REVIEW OF SYSTEMS:  At least 10 systems reviewed and otherwise acutely negative except as in the [address] Review of Systems   Constitutional: Positive for chills. HENT: Negative. Eyes: Negative. Respiratory: Negative. Cardiovascular: Negative. Gastrointestinal: Positive for abdominal pain. Endocrine: Negative. Genitourinary: Positive for pelvic pain and vaginal bleeding. Musculoskeletal: Positive for back pain. Skin: Negative. Allergic/Immunologic: Negative. Neurological: Negative. Hematological: Negative. Psychiatric/Behavioral: Negative. All other systems reviewed and are negative. Past Medical History:   Diagnosis Date    Depression     PCOD (polycystic ovarian disease)      Past Surgical History:   Procedure Laterality Date    CHOLECYSTECTOMY  2014    DENTAL SURGERY Bilateral     4 wisdom teeth removed     Family History   Problem Relation Age of Onset    Other Mother         fatty tumor on kidney.  Kidney removed    High Blood Pressure Mother     Depression Mother     Diabetes Mother     No Known Problems Father     Diabetes Maternal Aunt     Diabetes Maternal Uncle      Social History     Socioeconomic History    Marital status:  Spouse name: Not on file    Number of children: Not on file    Years of education: Not on file    Highest education level: Not on file   Occupational History    Not on file   Social Needs    Financial resource strain: Not on file    Food insecurity     Worry: Not on file     Inability: Not on file    Transportation needs     Medical: Not on file     Non-medical: Not on file   Tobacco Use    Smoking status: Never Smoker    Smokeless tobacco: Never Used   Substance and Sexual Activity    Alcohol use: Never     Frequency: Never    Drug use: Never    Sexual activity: Yes     Partners: Male   Lifestyle    Physical activity     Days per week: Not on file     Minutes per session: Not on file    Stress: Not on file   Relationships    Social connections     Talks on phone: Not on file     Gets together: Not on file     Attends Religion service: Not on file     Active member of club or organization: Not on file     Attends meetings of clubs or organizations: Not on file     Relationship status: Not on file    Intimate partner violence     Fear of current or ex partner: Not on file     Emotionally abused: Not on file     Physically abused: Not on file     Forced sexual activity: Not on file   Other Topics Concern    Not on file   Social History Narrative    Not on file     No current facility-administered medications for this encounter. Current Outpatient Medications   Medication Sig Dispense Refill    ibuprofen (ADVIL;MOTRIN) 600 MG tablet Take 1 tablet by mouth 4 times daily as needed for Pain 40 tablet 0    Prenatal MV-Min-Fe Fum-FA-DHA (PRENATAL 1 PO) Take 1 mg by mouth daily        No Known Allergies  No current facility-administered medications for this encounter.       Current Outpatient Medications   Medication Sig Dispense Refill    ibuprofen (ADVIL;MOTRIN) 600 MG tablet Take 1 tablet by mouth 4 times daily as needed for Pain 40 tablet 0    Prenatal MV-Min-Fe Fum-FA-DHA (PRENATAL 1 PO) Take Radiographs (if obtained):  [] The following radiograph was interpreted by myself in the absence of a radiologist:  [x] Radiologist's Report Reviewed:    US OB/GYN    EKG (if obtained): (All EKG's are interpreted by myself in the absence of a cardiologist)    MDM:    Patient here with vaginal bleeding, abdominal pain. Again patient is a G2, P1 at roughly 10 weeks gestation presents with vaginal bleeding miscarriage. Patient states last week she had an ultrasound OB/GYN's office at 10 weeks was told there is no heartbeat she was having a miscarriage states increased pain bleeding today. Called OB/GYN's office told to come to the ER. She appears well vital signs are stable she has mild generalized abdominal pain worse in the lower abdomen will get labs ultrasound. Will consult OB/GYN. Patient signed out to Dr. Chula Villar. I did order Rhogam.    CLINICAL IMPRESSION:  Final diagnoses:   Vaginal bleeding   Miscarriage       (Please note that portions of this note may have been completed with a voice recognition program. Efforts were made to edit the dictations but occasionally words aremis-transcribed.)    DISPOSITION REFERRAL (if applicable): Marcus Almendarez MD  [address] [place] [id number]  [phone]    Schedule an appointment as soon as possible for a visit         DISPOSITION MEDICATIONS (if applicable):  Discharge Medication List as of 6/1/2020  5:04 PM      START taking these medications    Details   ibuprofen (ADVIL;MOTRIN) 600 MG tablet Take 1 tablet by mouth 4 times daily as needed for Pain, Disp-40 tablet, R-[id number]                [facility], [facility],   06/02/20 0741

## 2020-06-01 NOTE — ED NOTES
Pt had Miscarriage last Thursday. Pt states she is passing clots. Pt says she isn't bleeding constantly but will have a rush of blood.       Bell Rooney RN  06/01/20 [id number]

## 2020-06-02 LAB
COMPONENT: NORMAL
STATUS: NORMAL
TRANSFUSION STATUS: NORMAL
UNIT DIVISION: 0
UNIT NUMBER: NORMAL

## 2020-06-10 ENCOUNTER — VIRTUAL VISIT (OUTPATIENT)
Dept: PSYCHOLOGY | Age: 30
End: 2020-06-10
Payer: COMMERCIAL

## 2020-06-10 PROCEDURE — 90832 PSYTX W PT 30 MINUTES: CPT | Performed by: PSYCHOLOGIST

## 2020-06-10 NOTE — PROGRESS NOTES
if her  himself is depressed or bipolar. O:  ----------------------------------------------------------------------------------------------------------------------  MSE:  Orientation:  oriented to person, place, time, and general circumstances  Appearance and behavior:  alert, cooperative  Speech:  spontaneous, normal rate and normal volume  Mood: depressed   Thought Content:  intact, hopelessness and helplessness  Thought Process:  linear, goal directed and coherent  Interest/Pleasure: Loss of Pleasure/Fun  Sleep disturbance: Yes  Motivation: Poor  Energy: Tired/Fatigued  Morbid ideation No  Suicide Assessment: no suicidal ideation    A:  ----------------------------------------------------------------------------------------------------------------------  Diagnosis:    1. Moderate episode of recurrent major depressive disorder (HCC)         PHQ Scores 4/28/2020 1/28/2020 8/27/2019 5/29/2019   PHQ2 Score 6 0 5 3   PHQ9 Score 18 0 14 11     Interpretation of Total Score Depression Severity: 1-4 = Minimal depression, 5-9 = Mild depression, 10-14 = Moderate depression, 15-19 = Moderately severe depression, 20-27 = Severe depression    P:  ----------------------------------------------------------------------------------------------------------------------    General:   [x] [place]-setting to identify pt's primary goals for [facility] visit / overall health   [x] Provided psychoeducation/handout on:   1.  Moderate episode of recurrent major depressive disorder (HCC)        [x]  Supportive interventions    Cognitive:   [x] Trained in strategies for increasing balanced thinking   [x] Cognitive strategies to target current mental health sx    [x] Identified and challenged maladaptive thoughts    Behavioral:   [x] Discussed and set plan for behavioral activation   [x] Discussed and problem-solved barriers in adhering to behavioral change plan   [x] Motivational Interviewing to increase patient confidence and compliance with       adhering to behavioral change plan   [x] Discussed potential barriers to change   [x] Discussed self-care (sleep, nutrition, rewarding activities, social support, exercise)    Other:   []   []   []   []    Recommendations to patient:    1. Return to Dr. Yovany Nava in 2 week(s)    2.                Feedback provided to pt's PCP via EPIC and/or oral report

## 2020-06-18 ENCOUNTER — VIRTUAL VISIT (OUTPATIENT)
Dept: PSYCHOLOGY | Age: 30
End: 2020-06-18
Payer: COMMERCIAL

## 2020-06-18 PROCEDURE — 90832 PSYTX W PT 30 MINUTES: CPT | Performed by: PSYCHOLOGIST

## 2020-06-25 ENCOUNTER — VIRTUAL VISIT (OUTPATIENT)
Dept: PSYCHOLOGY | Age: 30
End: 2020-06-25
Payer: COMMERCIAL

## 2020-06-25 PROCEDURE — 90832 PSYTX W PT 30 MINUTES: CPT | Performed by: PSYCHOLOGIST

## 2020-06-25 ASSESSMENT — PATIENT HEALTH QUESTIONNAIRE - PHQ9: DEPRESSION UNABLE TO ASSESS: URGENT/EMERGENT SITUATION

## 2020-06-25 NOTE — PROGRESS NOTES
Patient Location: Home       Provider Location ([facility]/State): Tara Cruz       This virtual visit was conducted via interactive/real-time audio/video. Pursuant to the emergency declaration under the [facility], [facility] waiver authority and the [employer] Act, this Virtual  Visit was conducted, with patient's consent, to reduce the patient's risk of exposure to COVID-19 and provide continuity of care for an established patient. Services were provided through a video synchronous discussion virtually to substitute for in-person clinic visit. Additionally, this provider made reasonable effort to verify identify of patient, conducted risk benefit analysis and have determined patient's presenting problem and condition are consistent with the use of telepsychology to patient's benefit, ensured pt has access, knowledge, and skills required to use required technology, obtained alternative means of contacting patient, provided pt with alternative means of contacting provider, reviewed informed consent and obtained verbal agreement in lieu of written informed consent, as such is rendered impossible due to the unexpected nature secondary to COVID-19 clinical recommendations. Behavioral Health Consultation  Nichole Fuentes Psy.D. Psychologist      Time spent with Patient: 30 minutes  Visit number: 7  Reason for Consult:  depression  Referring Provider: Filipe Marshall PA-C  [address] [facility] [address] [facility]  [address]  [place], [facility] Caprice Jose Henderson    S:  ----------------------------------------------------------------------------------------------------------------------  Depression  \"I'm not doing too good. \" Explained her relationship w her  remains tumultuous and problematic. \"He told me he doesn't love me anymore and he quit taking his medication. \" Additionally,  told her he has a \"restraining order\" on her, has spoken w a , and intends to pursue 50/50 custody. Pt is anxious and presenting w ruminative thought abt her future. Worried  will take their daughter. O:  ----------------------------------------------------------------------------------------------------------------------  MSE:  Orientation:  oriented to person, place, time, and general circumstances  Appearance and behavior:  alert, cooperative  Speech:  spontaneous, normal rate and normal volume  Mood: depressed   Thought Content:  intact, hopelessness and helplessness  Thought Process:  linear, goal directed and coherent  Interest/Pleasure: Loss of Pleasure/Fun  Sleep disturbance: Yes  Motivation: Poor  Energy: Tired/Fatigued  Morbid ideation No  Suicide Assessment: no suicidal ideation    A:  ----------------------------------------------------------------------------------------------------------------------  Diagnosis:    1. Moderate episode of recurrent major depressive disorder (HCC)         PHQ Scores 4/28/2020 1/28/2020 8/27/2019 5/29/2019   PHQ2 Score 6 0 5 3   PHQ9 Score 18 0 14 11     Interpretation of Total Score Depression Severity: 1-4 = Minimal depression, 5-9 = Mild depression, 10-14 = Moderate depression, 15-19 = Moderately severe depression, 20-27 = Severe depression    P:  ----------------------------------------------------------------------------------------------------------------------    General:   [x] [place]-setting to identify pt's primary goals for [facility] visit / overall health   [x] Provided psychoeducation/handout on:   1.  Moderate episode of recurrent major depressive disorder (HCC)        [x]  Supportive interventions    Cognitive:   [x] Trained in strategies for increasing balanced thinking   [x] Cognitive strategies to target current mental health sx    [x] Identified and challenged maladaptive thoughts    Behavioral:   [x] Discussed and set plan for behavioral activation   [x] Discussed and problem-solved barriers in adhering to behavioral change

## 2020-06-29 ENCOUNTER — TELEPHONE (OUTPATIENT)
Dept: INTERNAL MEDICINE CLINIC | Age: 30
End: 2020-06-29

## 2020-07-06 ENCOUNTER — VIRTUAL VISIT (OUTPATIENT)
Dept: PSYCHOLOGY | Age: 30
End: 2020-07-06
Payer: COMMERCIAL

## 2020-07-06 PROCEDURE — 90832 PSYTX W PT 30 MINUTES: CPT | Performed by: PSYCHOLOGIST

## 2020-07-06 ASSESSMENT — PATIENT HEALTH QUESTIONNAIRE - PHQ9: DEPRESSION UNABLE TO ASSESS: URGENT/EMERGENT SITUATION

## 2020-07-06 NOTE — PROGRESS NOTES
daughter from her. She is unsure how long she will be in [facility]. She is happy to be returning home and hopeful the distance will help her determine if she wants to divorce or not. O:  ----------------------------------------------------------------------------------------------------------------------  MSE:  Orientation:  oriented to person, place, time, and general circumstances  Appearance and behavior:  alert, cooperative  Speech:  spontaneous, normal rate and normal volume  Mood: depressed   Thought Content:  intact, hopelessness and helplessness  Thought Process:  linear, goal directed and coherent  Interest/Pleasure: Loss of Pleasure/Fun  Sleep disturbance: Yes  Motivation: Poor  Energy: Tired/Fatigued  Morbid ideation No  Suicide Assessment: no suicidal ideation    A:  ----------------------------------------------------------------------------------------------------------------------  Diagnosis:    1. Moderate episode of recurrent major depressive disorder (HCC)         PHQ Scores 4/28/2020 1/28/2020 8/27/2019 5/29/2019   PHQ2 Score 6 0 5 3   PHQ9 Score 18 0 14 11     Interpretation of Total Score Depression Severity: 1-4 = Minimal depression, 5-9 = Mild depression, 10-14 = Moderate depression, 15-19 = Moderately severe depression, 20-27 = Severe depression    P:  ----------------------------------------------------------------------------------------------------------------------    General:   [x] [place]-setting to identify pt's primary goals for [facility] visit / overall health   [x] Provided psychoeducation/handout on:   1.  Moderate episode of recurrent major depressive disorder (HCC)        [x]  Supportive interventions    Cognitive:   [x] Trained in strategies for increasing balanced thinking   [x] Cognitive strategies to target current mental health sx    [x] Identified and challenged maladaptive thoughts    Behavioral:   [x] Discussed and set plan for behavioral activation   [x] Discussed and problem-solved barriers in adhering to behavioral change plan   [x] Motivational Interviewing to increase patient confidence and compliance with       adhering to behavioral change plan   [x] Discussed potential barriers to change   [x] Discussed self-care (sleep, nutrition, rewarding activities, social support, exercise)    Other:   []   []   []   []    Recommendations to patient:    1. Return to Dr. Tootie Malave in 2 week(s)    2.                Feedback provided to pt's PCP via EPIC and/or oral report

## 2020-07-20 ENCOUNTER — VIRTUAL VISIT (OUTPATIENT)
Dept: PSYCHOLOGY | Age: 30
End: 2020-07-20
Payer: COMMERCIAL

## 2020-07-20 PROCEDURE — 90832 PSYTX W PT 30 MINUTES: CPT | Performed by: PSYCHOLOGIST

## 2020-07-20 NOTE — PROGRESS NOTES
Patient Location: Home       Provider Location ([facility]/State): Eduard Mauro       This virtual visit was conducted via interactive/real-time audio/video. Pursuant to the emergency declaration under the [facility], [facility] waiver authority and the [employer] Act, this Virtual  Visit was conducted, with patient's consent, to reduce the patient's risk of exposure to COVID-19 and provide continuity of care for an established patient. Services were provided through a video synchronous discussion virtually to substitute for in-person clinic visit. Additionally, this provider made reasonable effort to verify identify of patient, conducted risk benefit analysis and have determined patient's presenting problem and condition are consistent with the use of telepsychology to patient's benefit, ensured pt has access, knowledge, and skills required to use required technology, obtained alternative means of contacting patient, provided pt with alternative means of contacting provider, reviewed informed consent and obtained verbal agreement in lieu of written informed consent, as such is rendered impossible due to the unexpected nature secondary to COVID-19 clinical recommendations. Behavioral Health Consultation  Nichole Garnica Psy.D. Psychologist      Time spent with Patient: 30 minutes  Visit number: 9  Reason for Consult:  depression  Referring Provider: Laurie Welch PA-C  [address] [facility]  [address] [place] [address], [address] [place] [address] Tania    S:  ----------------------------------------------------------------------------------------------------------------------  Depression  \"There's been a lot going on. \" Rel w her  remains primary source of distress. [place] she is pregnant 2 weeks ago. Uncertain when/how to tell . Remains unclear if  still wants to divorce her. Pt wants to move to back to [facility] to be w her family.  Is also interviewing for jobs in [employer]. Mother's health is improving. Endorsed depressed mood, anhedonia, anergia, amotivation, poor sleep, fluctuating appetite, diminished concentration, and corrosive self-image. O:  ----------------------------------------------------------------------------------------------------------------------  MSE:  Orientation:  oriented to person, place, time, and general circumstances  Appearance and behavior:  alert, cooperative  Speech:  spontaneous, normal rate and normal volume  Mood: depressed   Thought Content:  intact, hopelessness and helplessness  Thought Process:  linear, goal directed and coherent  Interest/Pleasure: Loss of Pleasure/Fun  Sleep disturbance: Yes  Motivation: Poor  Energy: Tired/Fatigued  Morbid ideation No  Suicide Assessment: no suicidal ideation    A:  ----------------------------------------------------------------------------------------------------------------------  Diagnosis:    1. Moderate episode of recurrent major depressive disorder (HCC)         PHQ Scores 4/28/2020 1/28/2020 8/27/2019 5/29/2019   PHQ2 Score 6 0 5 3   PHQ9 Score 18 0 14 11     Interpretation of Total Score Depression Severity: 1-4 = Minimal depression, 5-9 = Mild depression, 10-14 = Moderate depression, 15-19 = Moderately severe depression, 20-27 = Severe depression    P:  ----------------------------------------------------------------------------------------------------------------------    General:   [x] [place]-setting to identify pt's primary goals for [facility] visit / overall health   [x] Provided psychoeducation/handout on:   1.  Moderate episode of recurrent major depressive disorder (HCC)        [x]  Supportive interventions    Cognitive:   [x] Trained in strategies for increasing balanced thinking   [x] Cognitive strategies to target current mental health sx    [x] Identified and challenged maladaptive thoughts    Behavioral:   [x] Discussed and set plan for behavioral activation   [x] Discussed and problem-solved barriers in adhering to behavioral change plan   [x] Motivational Interviewing to increase patient confidence and compliance with       adhering to behavioral change plan   [x] Discussed potential barriers to change   [x] Discussed self-care (sleep, nutrition, rewarding activities, social support, exercise)    Other:   []   []   []   []    Recommendations to patient:    1. Return to Dr. Tootie Malave in 2 week(s)    2.                Feedback provided to pt's PCP via EPIC and/or oral report

## 2020-08-03 ENCOUNTER — VIRTUAL VISIT (OUTPATIENT)
Dept: PSYCHOLOGY | Age: 30
End: 2020-08-03
Payer: COMMERCIAL

## 2020-08-03 ENCOUNTER — TELEPHONE (OUTPATIENT)
Dept: INTERNAL MEDICINE CLINIC | Age: 30
End: 2020-08-03

## 2020-08-03 PROCEDURE — 90832 PSYTX W PT 30 MINUTES: CPT | Performed by: PSYCHOLOGIST

## 2020-08-03 NOTE — PROGRESS NOTES
Patient Location: Home       Provider Location ([facility]/State): Anupama Stubbs       This virtual visit was conducted via interactive/real-time audio/video. Pursuant to the emergency declaration under the [facility], [facility] waiver authority and the [employer] Act, this Virtual  Visit was conducted, with patient's consent, to reduce the patient's risk of exposure to COVID-19 and provide continuity of care for an established patient. Services were provided through a video synchronous discussion virtually to substitute for in-person clinic visit. Additionally, this provider made reasonable effort to verify identify of patient, conducted risk benefit analysis and have determined patient's presenting problem and condition are consistent with the use of telepsychology to patient's benefit, ensured pt has access, knowledge, and skills required to use required technology, obtained alternative means of contacting patient, provided pt with alternative means of contacting provider, reviewed informed consent and obtained verbal agreement in lieu of written informed consent, as such is rendered impossible due to the unexpected nature secondary to COVID-19 clinical recommendations. Behavioral Health Consultation  Nichole Malave Psy.D. Psychologist      Time spent with Patient: 30 minutes  Visit number: 10  Reason for Consult:  depression  Referring Provider: Stana Harada, PA-C  [address] [facility]  [address] [place] [address], [address] [place] [address] Tania    S:  ----------------------------------------------------------------------------------------------------------------------  Depression  \"Things are a lot better. \"     She told her  she is pregnant and \"it didn't go very well. \"  Sated  was not supportive initially, however over the past week they have had more positive communication.  She recently interviewed for a job in [employer] and is hopeful she will get zoey [place] ongoing depressed mood, anhedonia, anergia, amotivation, poor sleep, fluctuating appetite, diminished concentration, and corrosive self-image. O:  ----------------------------------------------------------------------------------------------------------------------  MSE:  Orientation:  oriented to person, place, time, and general circumstances  Appearance and behavior:  alert, cooperative  Speech:  spontaneous, normal rate and normal volume  Mood: depressed   Thought Content:  intact, hopelessness and helplessness  Thought Process:  linear, goal directed and coherent  Interest/Pleasure: Loss of Pleasure/Fun  Sleep disturbance: Yes  Motivation: Poor  Energy: Tired/Fatigued  Morbid ideation No  Suicide Assessment: no suicidal ideation    A:  ----------------------------------------------------------------------------------------------------------------------  Diagnosis:    1. Moderate episode of recurrent major depressive disorder ([facility]    2. Depression, unspecified depression type         PHQ Scores 4/28/2020 1/28/2020 8/27/2019 5/29/2019   PHQ2 Score 6 0 5 3   PHQ9 Score 18 0 14 11     Interpretation of Total Score Depression Severity: 1-4 = Minimal depression, 5-9 = Mild depression, 10-14 = Moderate depression, 15-19 = Moderately severe depression, 20-27 = Severe depression    P:  ----------------------------------------------------------------------------------------------------------------------    General:   [x] [place]-setting to identify pt's primary goals for [facility] visit / overall health   [x] Provided psychoeducation/handout on:   1. Moderate episode of recurrent major depressive disorder ([facility]    2.  Depression, unspecified depression type        [x]  Supportive interventions    Cognitive:   [x] Trained in strategies for increasing balanced thinking   [x] Cognitive strategies to target current mental health sx    [x] Identified and challenged maladaptive thoughts    Behavioral:   [x] Discussed and set plan for behavioral activation   [x] Discussed and problem-solved barriers in adhering to behavioral change plan   [x] Motivational Interviewing to increase patient confidence and compliance with       adhering to behavioral change plan   [x] Discussed potential barriers to change   [x] Discussed self-care (sleep, nutrition, rewarding activities, social support, exercise)    Other:   []   []   []   []    Recommendations to patient:    1. Return to Dr. Yue Springer in 2 week(s)    2.                Feedback provided to pt's PCP via EPIC and/or oral report

## 2020-08-03 NOTE — TELEPHONE ENCOUNTER
Attempted to contact patient to schedule the follow up appointment with Dr. Yue Springer and to collect today's copay. Unable to leave message as voicemail was full.

## 2020-08-25 ENCOUNTER — VIRTUAL VISIT (OUTPATIENT)
Dept: PSYCHOLOGY | Age: 30
End: 2020-08-25
Payer: COMMERCIAL

## 2020-08-25 PROCEDURE — 90832 PSYTX W PT 30 MINUTES: CPT | Performed by: PSYCHOLOGIST

## 2020-08-25 NOTE — PROGRESS NOTES
Patient Location: Home       Provider Location (City/State): Katlyn Barlow       This virtual visit was conducted via interactive/real-time audio/video. Pursuant to the emergency declaration under the [facility] waiver authority and the [employer] Act, this Virtual  Visit was conducted, with patient's consent, to reduce the patient's risk of exposure to COVID-19 and provide continuity of care for an established patient. Services were provided through a video synchronous discussion virtually to substitute for in-person clinic visit. Additionally, this provider made reasonable effort to verify identify of patient, conducted risk benefit analysis and have determined patient's presenting problem and condition are consistent with the use of telepsychology to patient's benefit, ensured pt has access, knowledge, and skills required to use required technology, obtained alternative means of contacting patient, provided pt with alternative means of contacting provider, reviewed informed consent and obtained verbal agreement in lieu of written informed consent, as such is rendered impossible due to the unexpected nature secondary to COVID-19 clinical recommendations. Behavioral Health Consultation  Nichole Carrillo Psy.D. Psychologist      Time spent with Patient: 30 minutes  Visit number: 11  Reason for Consult:  depression  Referring Provider: Artemio Forte PA-C  [address] [facility]  [address] [place] [address], [address] [place] [address] Tania    S:  ----------------------------------------------------------------------------------------------------------------------  Depression  \"I don't have any good news. \" Remains frustrated regarding her relationship w her . He recently suggested pt should \"get an . \" Worried he is planning a divorce. Worried he will pursue full custody. \"I'm losing my mind and am so afraid. \"  has hired a  and is

## 2020-09-01 ENCOUNTER — VIRTUAL VISIT (OUTPATIENT)
Dept: PSYCHOLOGY | Age: 30
End: 2020-09-01
Payer: COMMERCIAL

## 2020-09-01 PROCEDURE — 90832 PSYTX W PT 30 MINUTES: CPT | Performed by: PSYCHOLOGIST

## 2020-09-01 NOTE — PROGRESS NOTES
Patient Location: Home       Provider Location ([facility]/State): Leonardfaiza Varma       This virtual visit was conducted via interactive/real-time audio/video. Pursuant to the emergency declaration under the [facility], [facility] waiver authority and the [employer] Act, this Virtual  Visit was conducted, with patient's consent, to reduce the patient's risk of exposure to COVID-19 and provide continuity of care for an established patient. Services were provided through a video synchronous discussion virtually to substitute for in-person clinic visit. Additionally, this provider made reasonable effort to verify identify of patient, conducted risk benefit analysis and have determined patient's presenting problem and condition are consistent with the use of telepsychology to patient's benefit, ensured pt has access, knowledge, and skills required to use required technology, obtained alternative means of contacting patient, provided pt with alternative means of contacting provider, reviewed informed consent and obtained verbal agreement in lieu of written informed consent, as such is rendered impossible due to the unexpected nature secondary to COVID-19 clinical recommendations. Behavioral Health Consultation  Nichole Pena Psy.D. Psychologist      Time spent with Patient: 30 minutes  Visit number: 12  Reason for Consult:  depression  Referring Provider: Crystal Deleon PA-C  [address] [facility]  [address] [place] [address], [facility]    S:  ----------------------------------------------------------------------------------------------------------------------  Depression  \"Things are still really bad. \" Endorsed depressed mood, anhedonia, anergia, amotivation, poor sleep, fluctuating appetite, diminished concentration, and corrosive self-image. Also angry, frustrated, and irritated w her .  [place] her mother-in-law is encouraging her  to divorce her, and will buy him a home if he chooses to do so. O:  ----------------------------------------------------------------------------------------------------------------------  MSE:  Orientation:  oriented to person, place, time, and general circumstances  Appearance and behavior:  alert, cooperative  Speech:  spontaneous, normal rate and normal volume  Mood: depressed   Thought Content:  intact, hopelessness and helplessness  Thought Process:  linear, goal directed and coherent  Interest/Pleasure: Loss of Pleasure/Fun  Sleep disturbance: Yes  Motivation: Poor  Energy: Tired/Fatigued  Morbid ideation No  Suicide Assessment: no suicidal ideation    A:  ----------------------------------------------------------------------------------------------------------------------  Diagnosis:    1. Moderate episode of recurrent major depressive disorder (HCC)         PHQ Scores 4/28/2020 1/28/2020 8/27/2019 5/29/2019   PHQ2 Score 6 0 5 3   PHQ9 Score 18 0 14 11     Interpretation of Total Score Depression Severity: 1-4 = Minimal depression, 5-9 = Mild depression, 10-14 = Moderate depression, 15-19 = Moderately severe depression, 20-27 = Severe depression    P:  ----------------------------------------------------------------------------------------------------------------------    General:   [x] [place]-setting to identify pt's primary goals for [facility] visit / overall health   [x] Provided psychoeducation/handout on:   1.  Moderate episode of recurrent major depressive disorder (HCC)        [x]  Supportive interventions    Cognitive:   [x] Trained in strategies for increasing balanced thinking   [x] Cognitive strategies to target current mental health sx    [x] Identified and challenged maladaptive thoughts    Behavioral:   [x] Discussed and set plan for behavioral activation   [x] Discussed and problem-solved barriers in adhering to behavioral change plan   [x] Motivational Interviewing to increase patient confidence

## 2020-09-15 ENCOUNTER — VIRTUAL VISIT (OUTPATIENT)
Dept: PSYCHOLOGY | Age: 30
End: 2020-09-15
Payer: COMMERCIAL

## 2020-09-15 PROCEDURE — 90832 PSYTX W PT 30 MINUTES: CPT | Performed by: PSYCHOLOGIST

## 2020-09-15 NOTE — PROGRESS NOTES
Patient Location: Home       Provider Location ([facility]/State): Rosanna Mack       This virtual visit was conducted via interactive/real-time audio/video. Pursuant to the emergency declaration under the [facility], [facility] waiver authority and the [employer] Act, this Virtual  Visit was conducted, with patient's consent, to reduce the patient's risk of exposure to COVID-19 and provide continuity of care for an established patient. Services were provided through a video synchronous discussion virtually to substitute for in-person clinic visit. Additionally, this provider made reasonable effort to verify identify of patient, conducted risk benefit analysis and have determined patient's presenting problem and condition are consistent with the use of telepsychology to patient's benefit, ensured pt has access, knowledge, and skills required to use required technology, obtained alternative means of contacting patient, provided pt with alternative means of contacting provider, reviewed informed consent and obtained verbal agreement in lieu of written informed consent, as such is rendered impossible due to the unexpected nature secondary to COVID-19 clinical recommendations. Behavioral Health Consultation  Nichole Thayer Psy.D. Psychologist      Time spent with Patient: 30 minutes  Visit number: 13  Reason for Consult:  depression  Referring Provider: Aime Greene PA-C  [address] [facility]  [address] [place] [address], [address] [place] [address] Tania    S:  ----------------------------------------------------------------------------------------------------------------------  Depression  \"Things aren't getting any better. \" Endorsed depressed mood, anhedonia, anergia, amotivation, poor sleep, fluctuating appetite, diminished concentration, and corrosive self-image. Also remains angry, frustrated, and irritated w her .  Suspects she will need to hire an  to help with divorce proceedings. Anxious about finances. Having anxious nightmares. After fights w her  will find herself thinking, \"What would it be like if I weren't here? \" Denied any suicidal planning or intent. O:  ----------------------------------------------------------------------------------------------------------------------  MSE:  Orientation:  oriented to person, place, time, and general circumstances  Appearance and behavior:  alert, cooperative  Speech:  spontaneous, normal rate and normal volume  Mood: depressed   Thought Content:  intact, hopelessness and helplessness  Thought Process:  linear, goal directed and coherent  Interest/Pleasure: Loss of Pleasure/Fun  Sleep disturbance: Yes  Motivation: Poor  Energy: Tired/Fatigued  Morbid ideation No  Suicide Assessment: no suicidal ideation    A:  ----------------------------------------------------------------------------------------------------------------------  Diagnosis:    1. Moderate episode of recurrent major depressive disorder (HCC)         PHQ Scores 4/28/2020 1/28/2020 8/27/2019 5/29/2019   PHQ2 Score 6 0 5 3   PHQ9 Score 18 0 14 11     Interpretation of Total Score Depression Severity: 1-4 = Minimal depression, 5-9 = Mild depression, 10-14 = Moderate depression, 15-19 = Moderately severe depression, 20-27 = Severe depression    P:  ----------------------------------------------------------------------------------------------------------------------    General:   [x] [place]-setting to identify pt's primary goals for [facility] visit / overall health   [x] Provided psychoeducation/handout on:   1.  Moderate episode of recurrent major depressive disorder (HCC)        [x]  Supportive interventions    Cognitive:   [x] Trained in strategies for increasing balanced thinking   [x] Cognitive strategies to target current mental health sx    [x] Identified and challenged maladaptive thoughts    Behavioral:   [x] Discussed and set plan for behavioral activation   [x] Discussed and problem-solved barriers in adhering to behavioral change plan   [x] Motivational Interviewing to increase patient confidence and compliance with       adhering to behavioral change plan   [x] Discussed potential barriers to change   [x] Discussed self-care (sleep, nutrition, rewarding activities, social support, exercise)    Other:   []   []   []   []    Recommendations to patient:    1. Return to Dr. Shelley Kirkpatrick in 2 week(s)    2.                Feedback provided to pt's PCP via EPIC and/or oral report

## 2020-09-29 ENCOUNTER — VIRTUAL VISIT (OUTPATIENT)
Dept: PSYCHOLOGY | Age: 30
End: 2020-09-29
Payer: COMMERCIAL

## 2020-09-29 PROCEDURE — 90832 PSYTX W PT 30 MINUTES: CPT | Performed by: PSYCHOLOGIST

## 2020-09-29 NOTE — PROGRESS NOTES
Patient Location: Home       Provider Location ([facility]/State): Chevyfaiza Hannon       This virtual visit was conducted via interactive/real-time audio/video. Pursuant to the emergency declaration under the [facility], [facility] waiver authority and the [employer] Act, this Virtual  Visit was conducted, with patient's consent, to reduce the patient's risk of exposure to COVID-19 and provide continuity of care for an established patient. Services were provided through a video synchronous discussion virtually to substitute for in-person clinic visit. Additionally, this provider made reasonable effort to verify identify of patient, conducted risk benefit analysis and have determined patient's presenting problem and condition are consistent with the use of telepsychology to patient's benefit, ensured pt has access, knowledge, and skills required to use required technology, obtained alternative means of contacting patient, provided pt with alternative means of contacting provider, reviewed informed consent and obtained verbal agreement in lieu of written informed consent, as such is rendered impossible due to the unexpected nature secondary to COVID-19 clinical recommendations. Behavioral Health Consultation  Nichole Mckeon Psy.D. Psychologist      Time spent with Patient: 30 minutes  Visit number: 14  Reason for Consult:  depression  Referring Provider: Ge Londono PA-C  [address] [facility]  [address]  [place]    S:  ----------------------------------------------------------------------------------------------------------------------  Depression  \"I don't know what I'm going to do. \" Feels overwhelmed with divorce proceedings. Financial situation is complex between pt and her . \"I feel like I'm failing my kids because they're not going to have a dad. \" He claims he wants to be involved in their life, however she does not believe this. Endorsed depressed mood, anhedonia, anergia, amotivation, poor sleep, fluctuating appetite, diminished concentration, and corrosive self-image. \"The depression is getting bad. \" Denied any SI/HI, planning, or intent. Feels helpeless and hopeless. O:  ----------------------------------------------------------------------------------------------------------------------  MSE:  Orientation:  oriented to person, place, time, and general circumstances  Appearance and behavior:  alert, cooperative  Speech:  spontaneous, normal rate and normal volume  Mood: depressed   Thought Content:  intact, hopelessness and helplessness  Thought Process:  linear, goal directed and coherent  Interest/Pleasure: Loss of Pleasure/Fun  Sleep disturbance: Yes  Motivation: Poor  Energy: Tired/Fatigued  Morbid ideation No  Suicide Assessment: no suicidal ideation    A:  ----------------------------------------------------------------------------------------------------------------------  Diagnosis:    1. Moderate episode of recurrent major depressive disorder (HCC)         PHQ Scores 4/28/2020 1/28/2020 8/27/2019 5/29/2019   PHQ2 Score 6 0 5 3   PHQ9 Score 18 0 14 11     Interpretation of Total Score Depression Severity: 1-4 = Minimal depression, 5-9 = Mild depression, 10-14 = Moderate depression, 15-19 = Moderately severe depression, 20-27 = Severe depression    P:  ----------------------------------------------------------------------------------------------------------------------    General:   [x] [place]-setting to identify pt's primary goals for [facility] visit / overall health   [x] Provided psychoeducation/handout on:   1.  Moderate episode of recurrent major depressive disorder (HCC)        [x]  Supportive interventions    Cognitive:   [x] Trained in strategies for increasing balanced thinking   [x] Cognitive strategies to target current mental health sx    [x] Identified and challenged maladaptive thoughts    Behavioral:   [x] Discussed and set plan for behavioral activation   [x] Discussed and problem-solved barriers in adhering to behavioral change plan   [x] Motivational Interviewing to increase patient confidence and compliance with       adhering to behavioral change plan   [x] Discussed potential barriers to change   [x] Discussed self-care (sleep, nutrition, rewarding activities, social support, exercise)    Other:   []   []   []   []    Recommendations to patient:    1. Return to Dr. Ariella Vann in 2 week(s)    2.                Feedback provided to pt's PCP via [employer] and/or oral report

## 2020-10-14 ENCOUNTER — VIRTUAL VISIT (OUTPATIENT)
Dept: PSYCHOLOGY | Age: 30
End: 2020-10-14
Payer: COMMERCIAL

## 2020-10-14 PROCEDURE — 90832 PSYTX W PT 30 MINUTES: CPT | Performed by: PSYCHOLOGIST

## 2020-10-14 NOTE — PROGRESS NOTES
Patient Location: Home       Provider Location ([facility]/State): Marjan Malave       This virtual visit was conducted via interactive/real-time audio/video. Pursuant to the emergency declaration under the [facility], [facility] waiver authority and the [employer] and [employer] Act, this Virtual  Visit was conducted, with patient's consent, to reduce the patient's risk of exposure to COVID-19 and provide continuity of care for an established patient. Services were provided through a video synchronous discussion virtually to substitute for in-person clinic visit. Additionally, this provider made reasonable effort to verify identify of patient, conducted risk benefit analysis and have determined patient's presenting problem and condition are consistent with the use of telepsychology to patient's benefit, ensured pt has access, knowledge, and skills required to use required technology, obtained alternative means of contacting patient, provided pt with alternative means of contacting provider, reviewed informed consent and obtained verbal agreement in lieu of written informed consent, as such is rendered impossible due to the unexpected nature secondary to COVID-19 clinical recommendations. Behavioral Health Consultation  Marge Rojas Psy.D. Psychologist      Time spent with Patient: 30 minutes  Visit number: 15  Reason for Consult:  depression  Referring Provider: Lorene Weldon PA-C  [address] [facility]  [address] [place] [address], [address] [place] [address] Tania    S:  ----------------------------------------------------------------------------------------------------------------------  Depression  \"I've been worrying more about my daughter. \" Stated daughter is missing her father, as pt remains  from him. Frustrated w her  for not supporting daughter financially. Pt is looking for a job, however also pregnant.  Hearing was delayed--she does not yet know what next

## 2020-10-27 ENCOUNTER — VIRTUAL VISIT (OUTPATIENT)
Dept: PSYCHOLOGY | Age: 30
End: 2020-10-27
Payer: COMMERCIAL

## 2020-10-27 PROCEDURE — 90832 PSYTX W PT 30 MINUTES: CPT | Performed by: PSYCHOLOGIST

## 2020-10-27 NOTE — PROGRESS NOTES
Patient Location: Home       Provider Location ([facility]/State): Vinh Koroma       This virtual visit was conducted via interactive/real-time audio/video. Pursuant to the emergency declaration under the [facility], [facility] waiver authority and the [employer] Act, this Virtual  Visit was conducted, with patient's consent, to reduce the patient's risk of exposure to COVID-19 and provide continuity of care for an established patient. Services were provided through a video synchronous discussion virtually to substitute for in-person clinic visit. Additionally, this provider made reasonable effort to verify identify of patient, conducted risk benefit analysis and have determined patient's presenting problem and condition are consistent with the use of telepsychology to patient's benefit, ensured pt has access, knowledge, and skills required to use required technology, obtained alternative means of contacting patient, provided pt with alternative means of contacting provider, reviewed informed consent and obtained verbal agreement in lieu of written informed consent, as such is rendered impossible due to the unexpected nature secondary to COVID-19 clinical recommendations. Behavioral Health Consultation  Nichole Garcia Psy.D. Psychologist      Time spent with Patient: 30 minutes  Visit number: 16  Reason for Consult:  depression  Referring Provider: Ray Batista PA-C  [address] [facility]  [address] [place] [address], [address] [place] [address] Tania    S:  ----------------------------------------------------------------------------------------------------------------------  Depression  \"Things with my  have been miserable still. \" Wondering if she is at fault for their marriage ending. \"He keeps saying I'm the problem and maybe he's right. \"     Ruminating abt the health of her unborn child. Denied any SI/HI, planning, or intent. Feels helpeless and hopeless. O:  ----------------------------------------------------------------------------------------------------------------------  MSE:  Orientation:  oriented to person, place, time, and general circumstances  Appearance and behavior:  alert, cooperative  Speech:  spontaneous, normal rate and normal volume  Mood: depressed   Thought Content:  intact, hopelessness and helplessness  Thought Process:  linear, goal directed and coherent  Interest/Pleasure: Loss of Pleasure/Fun  Sleep disturbance: Yes  Motivation: Poor  Energy: Tired/Fatigued  Morbid ideation No  Suicide Assessment: no suicidal ideation    A:  ----------------------------------------------------------------------------------------------------------------------  Diagnosis:    1. Moderate episode of recurrent major depressive disorder (HCC)         PHQ Scores 4/28/2020 1/28/2020 8/27/2019 5/29/2019   PHQ2 Score 6 0 5 3   PHQ9 Score 18 0 14 11     Interpretation of Total Score Depression Severity: 1-4 = Minimal depression, 5-9 = Mild depression, 10-14 = Moderate depression, 15-19 = Moderately severe depression, 20-27 = Severe depression    P:  ----------------------------------------------------------------------------------------------------------------------    General:   [x] [place]-setting to identify pt's primary goals for [facility] [place] visit / overall health   [x] Provided psychoeducation/handout on:   1.  Moderate episode of recurrent major depressive disorder (HCC)        [x]  Supportive interventions    Cognitive:   [x] Trained in strategies for increasing balanced thinking   [x] Cognitive strategies to target current mental health sx    [x] Identified and challenged maladaptive thoughts    Behavioral:   [x] Discussed and set plan for behavioral activation   [x] Discussed and problem-solved barriers in adhering to behavioral change plan   [x] Motivational Interviewing to increase patient confidence and compliance with       adhering to behavioral change plan [x] Discussed potential barriers to change   [x] Discussed self-care (sleep, nutrition, rewarding activities, social support, exercise)    Other:   []   []   []   []    Recommendations to patient:    1. Return to Dr. Yoly Guzman in 2 week(s)    2.                Feedback provided to pt's PCP via EPIC and/or oral report

## 2020-11-11 ENCOUNTER — VIRTUAL VISIT (OUTPATIENT)
Dept: PSYCHOLOGY | Age: 30
End: 2020-11-11
Payer: COMMERCIAL

## 2020-11-11 PROCEDURE — 90832 PSYTX W PT 30 MINUTES: CPT | Performed by: PSYCHOLOGIST

## 2020-11-11 NOTE — PROGRESS NOTES
Patient Location: Home       Provider Location ([facility]/State): Henna Farooq       This virtual visit was conducted via interactive/real-time audio/video. Pursuant to the emergency declaration under the [facility], [facility] waiver authority and the [employer] Act, this Virtual  Visit was conducted, with patient's consent, to reduce the patient's risk of exposure to COVID-19 and provide continuity of care for an established patient. Services were provided through a video synchronous discussion virtually to substitute for in-person clinic visit. Additionally, this provider made reasonable effort to verify identify of patient, conducted risk benefit analysis and have determined patient's presenting problem and condition are consistent with the use of telepsychology to patient's benefit, ensured pt has access, knowledge, and skills required to use required technology, obtained alternative means of contacting patient, provided pt with alternative means of contacting provider, reviewed informed consent and obtained verbal agreement in lieu of written informed consent, as such is rendered impossible due to the unexpected nature secondary to COVID-19 clinical recommendations. Behavioral Health Consultation  Janalee Lesches A. Arvilla Qua, Psy.D. Psychologist      Time spent with Patient: 30 minutes  Visit number: 17  Reason for Consult:  depression  Referring Provider: Gena Mccall PA-C  [address] [facility]  [address] [place] [address], [address] [place] [address] Tania    S:  ----------------------------------------------------------------------------------------------------------------------  Depression  \"I think I'm getting better. \" Stated she is moving towards more acceptance of her   from her, and her  disengaging from her relationship w their daughter. Has been less tearful and more emotionally stable over past week. Denied any SI/HI, planning, or intent. Feels helpeless and hopeless. O:  ----------------------------------------------------------------------------------------------------------------------  MSE:  Orientation:  oriented to person, place, time, and general circumstances  Appearance and behavior:  alert, cooperative  Speech:  spontaneous, normal rate and normal volume  Mood: depressed   Thought Content:  intact, hopelessness and helplessness  Thought Process:  linear, goal directed and coherent  Interest/Pleasure: Loss of Pleasure/Fun  Sleep disturbance: Yes  Motivation: Poor  Energy: Tired/Fatigued  Morbid ideation No  Suicide Assessment: no suicidal ideation    A:  ----------------------------------------------------------------------------------------------------------------------  Diagnosis:    1. Moderate episode of recurrent major depressive disorder (HCC)         PHQ Scores 4/28/2020 1/28/2020 8/27/2019 5/29/2019   PHQ2 Score 6 0 5 3   PHQ9 Score 18 0 14 11     Interpretation of Total Score Depression Severity: 1-4 = Minimal depression, 5-9 = Mild depression, 10-14 = Moderate depression, 15-19 = Moderately severe depression, 20-27 = Severe depression    P:  ----------------------------------------------------------------------------------------------------------------------    General:   [x] [place]-setting to identify pt's primary goals for [facility] visit / overall health   [x] Provided psychoeducation/handout on:   1.  Moderate episode of recurrent major depressive disorder (HCC)        [x]  Supportive interventions    Cognitive:   [x] Trained in strategies for increasing balanced thinking   [x] Cognitive strategies to target current mental health sx    [x] Identified and challenged maladaptive thoughts    Behavioral:   [x] Discussed and set plan for behavioral activation   [x] Discussed and problem-solved barriers in adhering to behavioral change plan   [x] Motivational Interviewing to increase patient confidence and compliance with adhering to behavioral change plan   [x] Discussed potential barriers to change   [x] Discussed self-care (sleep, nutrition, rewarding activities, social support, exercise)    Other:   []   []   []   []    Recommendations to patient:    1. Return to Dr. Buddy Wallace in 2 week(s)    2. This note will not be viewable in [employer] for the following reason(s). This is a Psychotherapy Note.         Feedback provided to pt's PCP via EPIC and/or oral report

## 2020-11-24 ENCOUNTER — VIRTUAL VISIT (OUTPATIENT)
Dept: PSYCHOLOGY | Age: 30
End: 2020-11-24
Payer: COMMERCIAL

## 2020-11-24 PROCEDURE — 90832 PSYTX W PT 30 MINUTES: CPT | Performed by: PSYCHOLOGIST

## 2020-11-24 NOTE — PROGRESS NOTES
Denied any SI/HI, planning, or intent. Feels helpeless and hopeless. O:  ----------------------------------------------------------------------------------------------------------------------  MSE:  Orientation:  oriented to person, place, time, and general circumstances  Appearance and behavior:  alert, cooperative, tearful  Speech:  spontaneous, normal rate and normal volume  Mood: depressed   Thought Content:  intact, hopelessness and helplessness  Thought Process:  linear, goal directed and coherent  Interest/Pleasure: Loss of Pleasure/Fun  Sleep disturbance: Yes  Motivation: Poor  Energy: Tired/Fatigued  Morbid ideation No  Suicide Assessment: no suicidal ideation    A:  ----------------------------------------------------------------------------------------------------------------------  Diagnosis:    1. Moderate episode of [facility] ([facility]    2. Anxiety         PHQ Scores 4/28/2020 1/28/2020 8/27/2019 5/29/2019   PHQ2 Score 6 0 5 3   PHQ9 Score 18 0 14 11     Interpretation of Total Score Depression Severity: 1-4 = Minimal depression, 5-9 = Mild depression, 10-14 = Moderate depression, 15-19 = Moderately severe depression, 20-27 = Severe depression    P:  ----------------------------------------------------------------------------------------------------------------------    General:   [x] [place]-setting to identify pt's primary goals for [facility] visit / overall health   [x] Provided psychoeducation/handout on:   1. Moderate episode of recurrent major depressive disorder ([facility]    2.  Anxiety        [x]  Supportive interventions    Cognitive:   [x] Trained in strategies for increasing balanced thinking   [x] Cognitive strategies to target current mental health sx    [x] Identified and challenged maladaptive thoughts    Behavioral:   [x] Discussed and set plan for behavioral activation   [x] Discussed and problem-solved barriers in adhering to behavioral change plan   [x] Motivational Interviewing to increase patient confidence and compliance with       adhering to behavioral change plan   [x] Discussed potential barriers to change   [x] Discussed self-care (sleep, nutrition, rewarding activities, social support, exercise)    Other:   []   []   []   []    Recommendations to patient:    1. Return to Dr. Huey Reynoso in 2 week(s)    2. This note will not be viewable in [employer] for the following reason(s). This is a Psychotherapy Note.         Feedback provided to pt's PCP via EPIC and/or oral report

## 2021-01-18 ENCOUNTER — VIRTUAL VISIT (OUTPATIENT)
Dept: PSYCHOLOGY | Age: 31
End: 2021-01-18
Payer: COMMERCIAL

## 2021-01-18 DIAGNOSIS — F41.9 ANXIETY: ICD-10-CM

## 2021-01-18 DIAGNOSIS — F33.1 MODERATE EPISODE OF RECURRENT MAJOR DEPRESSIVE DISORDER (HCC): Primary | ICD-10-CM

## 2021-01-18 PROCEDURE — 90832 PSYTX W PT 30 MINUTES: CPT | Performed by: PSYCHOLOGIST

## 2021-01-18 NOTE — PROGRESS NOTES
Patient Location: Home       Provider Location ([facility]/State): Ayse Norris       This virtual visit was conducted via interactive/real-time audio/video. Pursuant to the emergency declaration under the [facility] waiver authority and the [employer] Act, this Virtual  Visit was conducted, with patient's consent, to reduce the patient's risk of exposure to COVID-19 and provide continuity of care for an established patient. Services were provided through a video synchronous discussion virtually to substitute for in-person clinic visit. Additionally, this provider made reasonable effort to verify identify of patient, conducted risk benefit analysis and have determined patient's presenting problem and condition are consistent with the use of telepsychology to patient's benefit, ensured pt has access, knowledge, and skills required to use required technology, obtained alternative means of contacting patient, provided pt with alternative means of contacting provider, reviewed informed consent and obtained verbal agreement in lieu of written informed consent, as such is rendered impossible due to the unexpected nature secondary to COVID-19 clinical recommendations. Behavioral Health Consultation  Nichole Pablo Psy.D.   Psychologist      Time spent with Patient: 30 minutes  Visit number: 19  Reason for Consult:  Depression and anxiety  Referring Provider: Queen Elbert PA-C  [address] [facility]  [address] [place] [address],  [facility] Henderson    S:  ----------------------------------------------------------------------------------------------------------------------  Depression and anxiety \"A lot has happened. \" Explained  plans to continues with divorce, however he is going to give her full custody of their children. Carmen Vargas is frustrated that it seems he is going to leave her with all of their credit card debt. She is concerned abt the future of her children. Mood has been [place]. \"     Less tearful. Denied any SI/HI, planning, or intent. Feels helpeless and hopeless. O:  ----------------------------------------------------------------------------------------------------------------------  MSE:  Orientation:  oriented to person, place, time, and general circumstances  Appearance and behavior:  alert, cooperative, tearful  Speech:  spontaneous, normal rate and normal volume  Mood: depressed   Thought Content:  intact, hopelessness and helplessness  Thought Process:  linear, goal directed and coherent  Interest/Pleasure: Loss of Pleasure/Fun  Sleep disturbance: Yes  Motivation: Poor  Energy: Tired/Fatigued  Morbid ideation No  Suicide Assessment: no suicidal ideation    A:  ----------------------------------------------------------------------------------------------------------------------  Diagnosis:    1. Moderate episode of recurrent major depressive disorder ([facility]    2. Anxiety         PHQ Scores 4/28/2020 1/28/2020 8/27/2019 5/29/2019   PHQ2 Score 6 0 5 3   PHQ9 Score 18 0 14 11     Interpretation of Total Score Depression Severity: 1-4 = Minimal depression, 5-9 = Mild depression, 10-14 = Moderate depression, 15-19 = Moderately severe depression, 20-27 = Severe depression    P:  ----------------------------------------------------------------------------------------------------------------------    General:   [x] [place]-setting to identify pt's primary goals for [facility] visit / overall health   [x] Provided psychoeducation/handout on:   1. Moderate episode of recurrent major depressive disorder ([facility]    2.  Anxiety        [x]  Supportive interventions    Cognitive:

## 2021-02-17 ENCOUNTER — VIRTUAL VISIT (OUTPATIENT)
Dept: PSYCHOLOGY | Age: 31
End: 2021-02-17
Payer: COMMERCIAL

## 2021-02-17 DIAGNOSIS — F41.9 ANXIETY: ICD-10-CM

## 2021-02-17 DIAGNOSIS — F33.1 MODERATE EPISODE OF RECURRENT MAJOR DEPRESSIVE DISORDER (HCC): Primary | ICD-10-CM

## 2021-02-17 PROCEDURE — 90834 PSYTX W PT 45 MINUTES: CPT | Performed by: PSYCHOLOGIST

## 2021-02-17 NOTE — PROGRESS NOTES
Patient Location: Home       Provider Location ([facility]/State): [place]       This virtual visit was conducted via interactive/real-time audio/video. Pursuant to the emergency declaration under the [facility] waiver authority and the [employer] Act, this Virtual  Visit was conducted, with patient's consent, to reduce the patient's risk of exposure to COVID-19 and provide continuity of care for an established patient. Services were provided through a video synchronous discussion virtually to substitute for in-person clinic visit. Additionally, this provider made reasonable effort to verify identify of patient, conducted risk benefit analysis and have determined patient's presenting problem and condition are consistent with the use of telepsychology to patient's benefit, ensured pt has access, knowledge, and skills required to use required technology, obtained alternative means of contacting patient, provided pt with alternative means of contacting provider, reviewed informed consent and obtained verbal agreement in lieu of written informed consent, as such is rendered impossible due to the unexpected nature secondary to COVID-19 clinical recommendations. Behavioral Health Consultation  Nichole Costa Psy.D. Psychologist      Time spent with Patient: 40 minutes  Visit number: 20  Reason for Consult:  Depression and anxiety  Referring Provider: Aislinn Bell PA-C  [address] [facility]  [address] [place] [address],  [address] [place] [address] T[place]    S:  ----------------------------------------------------------------------------------------------------------------------  Depression and anxiety  \"I'm worrying more about the baby. \" Her OB wants to schedule , as baby is breech. Expects to give birth in next 2 weeks. Mood has been \"really stressed. \"     Unsure abt 's mental health as he recently attended inpatient treatment in [facility]. Tearful throughout visit    Denied any SI/HI, planning, or intent. Feels helpeless and hopeless. O:  ----------------------------------------------------------------------------------------------------------------------  MSE:  Orientation:  oriented to person, place, time, and general circumstances  Appearance and behavior:  alert, cooperative, tearful  Speech:  spontaneous, normal rate and normal volume  Mood: depressed   Thought Content:  intact, hopelessness and helplessness  Thought Process:  linear, goal directed and coherent  Interest/Pleasure: Loss of Pleasure/Fun  Sleep disturbance: Yes  Motivation: Poor  Energy: Tired/Fatigued  Morbid ideation No  Suicide Assessment: no suicidal ideation    A:  ----------------------------------------------------------------------------------------------------------------------  Diagnosis:    1. Moderate episode of recurrent major depressive disorder ([facility]    2. Anxiety         PHQ Scores 4/28/2020 1/28/2020 8/27/2019 5/29/2019   PHQ2 Score 6 0 5 3   PHQ9 Score 18 0 14 11     Interpretation of Total Score Depression Severity: 1-4 = Minimal depression, 5-9 = Mild depression, 10-14 = Moderate depression, 15-19 = Moderately severe depression, 20-27 = Severe depression    P:  ----------------------------------------------------------------------------------------------------------------------    General:   [x] [place]-setting to identify pt's primary goals for [facility] visit / overall health   [x] Provided psychoeducation/handout on:   1. Moderate episode of recurrent major depressive disorder ([facility]    2.  Anxiety        [x]  Supportive interventions    Cognitive:   [x] Trained in strategies for increasing balanced thinking   [x] Cognitive strategies to target current mental health sx    [x] Identified and challenged maladaptive thoughts    Behavioral:   [x] Discussed and set plan for behavioral activation [x] Discussed and problem-solved barriers in adhering to behavioral change plan   [x] Motivational Interviewing to increase patient confidence and compliance with       adhering to behavioral change plan   [x] Discussed potential barriers to change   [x] Discussed self-care (sleep, nutrition, rewarding activities, social support, exercise)    Other:   []   []   []   []    Recommendations to patient:    1. Return to Dr. Christen Rodney in 2 week(s)    2. This note will not be viewable in [email or web address] for the following reason(s). This is a Psychotherapy Note.       Feedback provided to pt's PCP via EPIC and/or oral report

## 2025-03-13 NOTE — ASSESSMENT & PLAN NOTE
Discussed treatment options/dietary change/maintain good water intake/exercise with pt  Refill colace Report given to YASMIN Dougherty